# Patient Record
Sex: MALE | Race: BLACK OR AFRICAN AMERICAN | NOT HISPANIC OR LATINO | Employment: OTHER | URBAN - METROPOLITAN AREA
[De-identification: names, ages, dates, MRNs, and addresses within clinical notes are randomized per-mention and may not be internally consistent; named-entity substitution may affect disease eponyms.]

---

## 2020-12-03 ENCOUNTER — OFFICE VISIT (OUTPATIENT)
Dept: URGENT CARE | Facility: CLINIC | Age: 70
End: 2020-12-03
Payer: MEDICARE

## 2020-12-03 VITALS
RESPIRATION RATE: 18 BRPM | BODY MASS INDEX: 25.8 KG/M2 | WEIGHT: 174.2 LBS | HEIGHT: 69 IN | DIASTOLIC BLOOD PRESSURE: 72 MMHG | OXYGEN SATURATION: 98 % | SYSTOLIC BLOOD PRESSURE: 144 MMHG | HEART RATE: 88 BPM | TEMPERATURE: 97.8 F

## 2020-12-03 DIAGNOSIS — M25.511 ACUTE PAIN OF RIGHT SHOULDER: Primary | ICD-10-CM

## 2020-12-03 PROCEDURE — 99213 OFFICE O/P EST LOW 20 MIN: CPT | Performed by: PHYSICIAN ASSISTANT

## 2020-12-03 RX ORDER — IBUPROFEN 200 MG
200 TABLET ORAL EVERY 6 HOURS PRN
COMMUNITY
End: 2021-05-06

## 2020-12-03 RX ORDER — AMOXICILLIN 500 MG/1
500 TABLET, FILM COATED ORAL 2 TIMES DAILY
COMMUNITY
End: 2021-05-06

## 2020-12-10 ENCOUNTER — APPOINTMENT (OUTPATIENT)
Dept: RADIOLOGY | Facility: CLINIC | Age: 70
End: 2020-12-10
Payer: MEDICARE

## 2020-12-10 VITALS
HEIGHT: 69 IN | WEIGHT: 174 LBS | DIASTOLIC BLOOD PRESSURE: 70 MMHG | HEART RATE: 98 BPM | SYSTOLIC BLOOD PRESSURE: 150 MMHG | BODY MASS INDEX: 25.77 KG/M2

## 2020-12-10 DIAGNOSIS — M25.511 ACUTE PAIN OF RIGHT SHOULDER: ICD-10-CM

## 2020-12-10 DIAGNOSIS — M19.011 PRIMARY OSTEOARTHRITIS OF RIGHT SHOULDER: Primary | ICD-10-CM

## 2020-12-10 DIAGNOSIS — M25.511 CHRONIC RIGHT SHOULDER PAIN: ICD-10-CM

## 2020-12-10 DIAGNOSIS — G89.29 CHRONIC RIGHT SHOULDER PAIN: ICD-10-CM

## 2020-12-10 PROCEDURE — 73030 X-RAY EXAM OF SHOULDER: CPT

## 2020-12-10 PROCEDURE — 20610 DRAIN/INJ JOINT/BURSA W/O US: CPT | Performed by: ORTHOPAEDIC SURGERY

## 2020-12-10 PROCEDURE — 99204 OFFICE O/P NEW MOD 45 MIN: CPT | Performed by: ORTHOPAEDIC SURGERY

## 2020-12-10 RX ORDER — LIDOCAINE HYDROCHLORIDE 10 MG/ML
4 INJECTION, SOLUTION INFILTRATION; PERINEURAL
Status: COMPLETED | OUTPATIENT
Start: 2020-12-10 | End: 2020-12-10

## 2020-12-10 RX ORDER — DEXAMETHASONE SODIUM PHOSPHATE 100 MG/10ML
40 INJECTION INTRAMUSCULAR; INTRAVENOUS
Status: COMPLETED | OUTPATIENT
Start: 2020-12-10 | End: 2020-12-10

## 2020-12-10 RX ADMIN — LIDOCAINE HYDROCHLORIDE 4 ML: 10 INJECTION, SOLUTION INFILTRATION; PERINEURAL at 10:17

## 2020-12-10 RX ADMIN — DEXAMETHASONE SODIUM PHOSPHATE 40 MG: 100 INJECTION INTRAMUSCULAR; INTRAVENOUS at 10:17

## 2021-01-19 ENCOUNTER — EVALUATION (OUTPATIENT)
Dept: PHYSICAL THERAPY | Facility: CLINIC | Age: 71
End: 2021-01-19
Payer: MEDICARE

## 2021-01-19 DIAGNOSIS — G89.29 CHRONIC RIGHT SHOULDER PAIN: ICD-10-CM

## 2021-01-19 DIAGNOSIS — M19.011 PRIMARY OSTEOARTHRITIS OF RIGHT SHOULDER: Primary | ICD-10-CM

## 2021-01-19 DIAGNOSIS — M25.511 CHRONIC RIGHT SHOULDER PAIN: ICD-10-CM

## 2021-01-19 PROCEDURE — 97161 PT EVAL LOW COMPLEX 20 MIN: CPT | Performed by: PHYSICAL THERAPIST

## 2021-01-19 NOTE — PROGRESS NOTES
PT Evaluation     Today's date: 2021  Patient name: Brittny Saldaña  : 1950  MRN: [id number]  Referring provider: Eileen Robles  Dx:   Encounter Diagnosis     ICD-10-CM    1  Primary osteoarthritis of right shoulder  M19 011 Ambulatory referral to Physical Therapy   2  Chronic right shoulder pain  M25 511 Ambulatory referral to Physical Therapy    G89 [id number]                   Assessment  Assessment details: Brittny Saldaña is a 79 y o  male who presents to physical therapy with pain, decreased UE range of motion, decreased UE strength, impaired function, decreased activity tolerance and poor posture  Patient's clinical presentation is consistent with their referring diagnosis of Primary osteoarthritis of right shoulder  (primary encounter diagnosis)  Chronic right shoulder pain  The pt presents with functional limitations of ADLs, recreational activities, self-care and reaching  Pt would benefit from physical therapy services to address these limitations and maximize function  Pt was instructed and educated on good sitting posture today and demonstrates understanding  Impairments: abnormal muscle firing, abnormal muscle tone, abnormal or restricted ROM, activity intolerance, impaired physical strength, lacks appropriate home exercise program, pain with function, scapular dyskinesis, poor posture  and poor body mechanics  Understanding of Dx/Px/POC: good   Prognosis: good    Goals  Short term goals:  (3 weeks)  1  Patient will have pain level 2/10 right  shoulder at rest  2  Patient will report a 50% improvement in symptoms with ADL's  3  Patient will demonstrate appropriate scapulohumeral rhythm with reaching shoulder height and below  4  Patient will have improved right shoulder ROM by 20 degrees    Long term goals: (6 weeks)  1  Patient will report 85 % improvement improvement in symptoms with ADL's  2  Patient will have pain level 2/10 right shoulder with ADL's   3   Patient will demonstrate appropriate scapulohumeral rhythm with reaching overhead  4  Patient will be independent in a comprehensive home exercise program      Plan  Patient would benefit from: PT eval and skilled physical therapy  Planned modality interventions: cryotherapy and thermotherapy: hydrocollator packs  Planned therapy interventions: joint mobilization, manual therapy, massage, neuromuscular re-education, patient education, postural training, strengthening, stretching, therapeutic activities, ADL training, functional ROM exercises, home exercise program, abdominal trunk stabilization and therapeutic exercise  Frequency: 2x week  Duration in visits: 12  Duration in weeks: 6  Treatment plan discussed with: patient        Subjective Evaluation    History of Present Illness  Mechanism of injury: He reports pain on/ off in right shoulder for the last 10 years since a fall at work  Hefollowed up with Dr Misti Ignacio  An x-ray was taken  He received a cortisone injection  He was then referred to PT  Pain  Current pain rating: 3  At best pain ratin  At worst pain ratin  Quality: sharp and dull ache  Relieving factors: rest  Aggravating factors: lifting and overhead activity    Social Support    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, pull, push ,carry  Hand dominance: right    Patient Goals  Patient goals for therapy: decreased pain          Objective     Postural Observations  Seated posture: fair    Additional Postural Observation Details  Patient has slouched posture in sitting       Palpation     Right   Hypertonic in the pectoralis major, pectoralis minor, rhomboids and upper trapezius  Tenderness of the pectoralis minor and upper trapezius       Passive Range of Motion   Left Shoulder   Flexion: 170 degrees   Abduction: 170 degrees   External rotation 0°: 85 degrees   Internal rotation 0°: 85 degrees     Right Shoulder   Flexion: 131 degrees   Abduction: 74 degrees   External rotation 0°: 62 degrees   Internal rotation 0°: 77 degrees     Scapular Mobility     Right Shoulder   Scapular mobility: poor    Scapular Mobility beyond 90° FF   Scapular elevation: minimal    Strength/Myotome Testing     Left Shoulder     Planes of Motion   Flexion: 5   Abduction: 5   External rotation at 0°: 5   Internal rotation at 0°: 5     Right Shoulder     Planes of Motion   Flexion: 4   Abduction: 4-   External rotation at 0°: 4-   Internal rotation at 0°: 4              Precautions: -  MT x 1 , TE x 1      Specialty Daily Treatment Diary       Manuals 1/19/21       Visit # 1       STM UT, pec group        PROM shld        ST joint mobs                Warm-up        NuStep        Neuro Re-Ed        Posture correct        Scap squeeze                                Ther Ex        TB row        TB ext        Overhead w/ ball        Pulleys        S/L ER        S/L rotation        Wand flex                Ther Activity                                Modalities        CP / MH

## 2021-01-28 ENCOUNTER — OFFICE VISIT (OUTPATIENT)
Dept: PHYSICAL THERAPY | Facility: CLINIC | Age: 71
End: 2021-01-28
Payer: MEDICARE

## 2021-01-28 DIAGNOSIS — M19.011 PRIMARY OSTEOARTHRITIS OF RIGHT SHOULDER: Primary | ICD-10-CM

## 2021-01-28 DIAGNOSIS — G89.29 CHRONIC RIGHT SHOULDER PAIN: ICD-10-CM

## 2021-01-28 DIAGNOSIS — M25.511 CHRONIC RIGHT SHOULDER PAIN: ICD-10-CM

## 2021-01-28 PROCEDURE — 97110 THERAPEUTIC EXERCISES: CPT

## 2021-01-28 PROCEDURE — 97140 MANUAL THERAPY 1/> REGIONS: CPT

## 2021-01-28 NOTE — PROGRESS NOTES
Daily Note      Today's date: 2021  Patient name: Rell Guerrero  : 1950  MRN: [id number]  Referring provider: Jenny Quan  Dx:   Encounter Diagnosis     ICD-10-CM    1  Primary osteoarthritis of right shoulder  M19 011    2  Chronic right shoulder pain  M25 511     G89 29        Subjective: Pt reports that his shoulder is feeling much better than two weeks ago  Pt states he has 1-2/10 pain  Objective: See treatment diary below    Assessment: Pt tolerated treatment well  Pt introduced to cane flexion, rows, shoulder extension, and Nustep and noted no increase in symptoms in the R shoulder  Pt presents with mild limitations in shoulder flexion A/AROM  Pt educated in HEP including rows, shoulder extensions, and cane flexion and verbalized good understanding  Plan: Continue per plan of care           Precautions: -  MT x 1 , TE x 1      Specialty Daily Treatment Diary       Manuals 21      Visit # 1 2      STM UT, pec group  STM R pec minor      PROM shld  Flex, abd, ER, IR      ST joint mobs                Warm-up        NuStep  5' L1 end of session      Neuro Re-Ed        Posture correct        Scap squeeze  10x3s                              Ther Ex        TB row  20x green      TB ext  20x red      Overhead w/ ball  Ball circles 30/30      Pulleys  20x      S/L ER  20x AROM      S/L rotation        Wand flex  20x supine              Ther Activity        Pt education  HEP - rows, ext, cane flex                      Modalities        CP / MH

## 2021-03-28 NOTE — PROGRESS NOTES
PT Discharge    Patient has not attended scheduled PT sessions  Goal status is unknown  D/C at this time due to non-compliance

## 2021-05-06 ENCOUNTER — OFFICE VISIT (OUTPATIENT)
Dept: FAMILY MEDICINE CLINIC | Facility: CLINIC | Age: 71
End: 2021-05-06
Payer: MEDICARE

## 2021-05-06 VITALS
RESPIRATION RATE: 16 BRPM | HEIGHT: 69 IN | DIASTOLIC BLOOD PRESSURE: 78 MMHG | BODY MASS INDEX: 25.62 KG/M2 | WEIGHT: 173 LBS | HEART RATE: 100 BPM | SYSTOLIC BLOOD PRESSURE: 134 MMHG | TEMPERATURE: 98.3 F

## 2021-05-06 DIAGNOSIS — K21.9 GASTROESOPHAGEAL REFLUX DISEASE, UNSPECIFIED WHETHER ESOPHAGITIS PRESENT: ICD-10-CM

## 2021-05-06 DIAGNOSIS — Z13.89 SCREENING FOR CARDIOVASCULAR, RESPIRATORY, AND GENITOURINARY DISEASES: ICD-10-CM

## 2021-05-06 DIAGNOSIS — J30.2 SEASONAL ALLERGIC RHINITIS, UNSPECIFIED TRIGGER: ICD-10-CM

## 2021-05-06 DIAGNOSIS — H10.13 ALLERGIC CONJUNCTIVITIS OF BOTH EYES: ICD-10-CM

## 2021-05-06 DIAGNOSIS — N52.9 ERECTILE DYSFUNCTION, UNSPECIFIED ERECTILE DYSFUNCTION TYPE: ICD-10-CM

## 2021-05-06 DIAGNOSIS — Z00.00 MEDICARE ANNUAL WELLNESS VISIT, SUBSEQUENT: Primary | ICD-10-CM

## 2021-05-06 DIAGNOSIS — Z13.6 SCREENING FOR CARDIOVASCULAR, RESPIRATORY, AND GENITOURINARY DISEASES: ICD-10-CM

## 2021-05-06 DIAGNOSIS — Z13.83 SCREENING FOR CARDIOVASCULAR, RESPIRATORY, AND GENITOURINARY DISEASES: ICD-10-CM

## 2021-05-06 DIAGNOSIS — Z12.11 COLON CANCER SCREENING: ICD-10-CM

## 2021-05-06 DIAGNOSIS — Z12.5 PROSTATE CANCER SCREENING: ICD-10-CM

## 2021-05-06 DIAGNOSIS — Z13.1 SCREENING FOR DIABETES MELLITUS (DM): ICD-10-CM

## 2021-05-06 DIAGNOSIS — H61.21 IMPACTED CERUMEN OF RIGHT EAR: ICD-10-CM

## 2021-05-06 PROCEDURE — G0438 PPPS, INITIAL VISIT: HCPCS | Performed by: FAMILY MEDICINE

## 2021-05-06 PROCEDURE — 1123F ACP DISCUSS/DSCN MKR DOCD: CPT | Performed by: FAMILY MEDICINE

## 2021-05-06 PROCEDURE — 99214 OFFICE O/P EST MOD 30 MIN: CPT | Performed by: FAMILY MEDICINE

## 2021-05-06 RX ORDER — FLUTICASONE PROPIONATE 50 MCG
2 SPRAY, SUSPENSION (ML) NASAL DAILY
Qty: 1 BOTTLE | Refills: 5 | Status: SHIPPED | OUTPATIENT
Start: 2021-05-06

## 2021-05-06 RX ORDER — OLOPATADINE HYDROCHLORIDE 1 MG/ML
1 SOLUTION/ DROPS OPHTHALMIC 2 TIMES DAILY
Qty: 5 ML | Refills: 5 | Status: SHIPPED | OUTPATIENT
Start: 2021-05-06

## 2021-05-06 RX ORDER — TADALAFIL 20 MG/1
20 TABLET ORAL DAILY PRN
Qty: 20 TABLET | Refills: 5 | Status: SHIPPED | OUTPATIENT
Start: 2021-05-06 | End: 2021-12-27 | Stop reason: SDUPTHER

## 2021-05-06 NOTE — PATIENT INSTRUCTIONS
Please try to get us your old medical records and vaccination records  We recommend you get your screening colonoscopy updated and you may need an upper endoscopy also for your chronic acid reflux symptoms  You do have some wax in your right ear  You can try using hydrogen peroxide before a shower and see if it will help it come out  You may need to do this daily for 1 week  We can try a prescription for flonase nasal spray and patanol eye drops for your allergy symptoms  Medicare Preventive Visit Patient Instructions  Thank you for completing your Welcome to Medicare Visit or Medicare Annual Wellness Visit today  Your next wellness visit will be due in one year (5/7/2022)  The screening/preventive services that you may require over the next 5-10 years are detailed below  Some tests may not apply to you based off risk factors and/or age  Screening tests ordered at today's visit but not completed yet may show as past due  Also, please note that scanned in results may not display below  Preventive Screenings:  Service Recommendations Previous Testing/Comments   Colorectal Cancer Screening  · Colonoscopy    · Fecal Occult Blood Test (FOBT)/Fecal Immunochemical Test (FIT)  · Fecal DNA/Cologuard Test  · Flexible Sigmoidoscopy Age: 54-65 years old   Colonoscopy: every 10 years (May be performed more frequently if at higher risk)  OR  FOBT/FIT: every 1 year  OR  Cologuard: every 3 years  OR  Sigmoidoscopy: every 5 years  Screening may be recommended earlier than age 48 if at higher risk for colorectal cancer  Also, an individualized decision between you and your healthcare provider will decide whether screening between the ages of 74-80 would be appropriate   Colonoscopy: Not on file  FOBT/FIT: Not on file  Cologuard: Not on file  Sigmoidoscopy: Not on file    Risks and Benefits Discussed     Prostate Cancer Screening Individualized decision between patient and health care provider in men between ages of 53-78 Medicare will cover every 12 months beginning on the day after your 50th birthday PSA: No results in last 5 years     Risks and Benefits Discussed     Hepatitis C Screening Once for adults born between 1945 and 1965  More frequently in patients at high risk for Hepatitis C Hep C Antibody: Not on file    Screening Current  Patient Declines   Diabetes Screening 1-2 times per year if you're at risk for diabetes or have pre-diabetes Fasting glucose: No results in last 5 years   A1C: No results in last 5 years    Risks and Benefits Discussed   Cholesterol Screening Once every 5 years if you don't have a lipid disorder  May order more often based on risk factors  Lipid panel: Not on file    Risks and Benefits Discussed      Other Preventive Screenings Covered by Medicare:  1  Abdominal Aortic Aneurysm (AAA) Screening: covered once if your at risk  You're considered to be at risk if you have a family history of AAA or a male between the age of 73-68 who smoking at least 100 cigarettes in your lifetime  2  Lung Cancer Screening: covers low dose CT scan once per year if you meet all of the following conditions: (1) Age 50-69; (2) No signs or symptoms of lung cancer; (3) Current smoker or have quit smoking within the last 15 years; (4) You have a tobacco smoking history of at least 30 pack years (packs per day x number of years you smoked); (5) You get a written order from a healthcare provider  3  Glaucoma Screening: covered annually if you're considered high risk: (1) You have diabetes OR (2) Family history of glaucoma OR (3)  aged 48 and older OR (3)  American aged 72 and older  3  Osteoporosis Screening: covered every 2 years if you meet one of the following conditions: (1) Have a vertebral abnormality; (2) On glucocorticoid therapy for more than 3 months; (3) Have primary hyperparathyroidism; (4) On osteoporosis medications and need to assess response to drug therapy    5  HIV Screening: covered annually if you're between the age of 15-65  Also covered annually if you are younger than 13 and older than 72 with risk factors for HIV infection  For pregnant patients, it is covered up to 3 times per pregnancy  Immunizations:  Immunization Recommendations   Influenza Vaccine Annual influenza vaccination during flu season is recommended for all persons aged >= 6 months who do not have contraindications   Pneumococcal Vaccine (Prevnar and Pneumovax)  * Prevnar = PCV13  * Pneumovax = PPSV23 Adults 25-60 years old: 1-3 doses may be recommended based on certain risk factors  Adults 72 years old: Prevnar (PCV13) vaccine recommended followed by Pneumovax (PPSV23) vaccine  If already received PPSV23 since turning 65, then PCV13 recommended at least one year after PPSV23 dose  Hepatitis B Vaccine 3 dose series if at intermediate or high risk (ex: diabetes, end stage renal disease, liver disease)   Tetanus (Td) Vaccine - COST NOT COVERED BY MEDICARE PART B Following completion of primary series, a booster dose should be given every 10 years to maintain immunity against tetanus  Td may also be given as tetanus wound prophylaxis  Tdap Vaccine - COST NOT COVERED BY MEDICARE PART B Recommended at least once for all adults  For pregnant patients, recommended with each pregnancy  Shingles Vaccine (Shingrix) - COST NOT COVERED BY MEDICARE PART B  2 shot series recommended in those aged 48 and above     Health Maintenance Due:      Topic Date Due    Colorectal Cancer Screening  Never done    Hepatitis C Screening  05/06/2035 (Originally 1950)     Immunizations Due:      Topic Date Due    COVID-19 Vaccine (1) Never done     Advance Directives   What are advance directives? Advance directives are legal documents that state your wishes and plans for medical care  These plans are made ahead of time in case you lose your ability to make decisions for yourself   Advance directives can apply to any medical decision, such as the treatments you want, and if you want to donate organs  What are the types of advance directives? There are many types of advance directives, and each state has rules about how to use them  You may choose a combination of any of the following:  · Living will: This is a written record of the treatment you want  You can also choose which treatments you do not want, which to limit, and which to stop at a certain time  This includes surgery, medicine, IV fluid, and tube feedings  · Durable power of  for healthcare [facility]): This is a written record that states who you want to make healthcare choices for you when you are unable to make them for yourself  This person, called a proxy, is usually a family member or a friend  You may choose more than 1 proxy  · Do not resuscitate (DNR) order:  A DNR order is used in case your heart stops beating or you stop breathing  It is a request not to have certain forms of treatment, such as CPR  A DNR order may be included in other types of advance directives  · Medical directive: This covers the care that you want if you are in a coma, near death, or unable to make decisions for yourself  You can list the treatments you want for each condition  Treatment may include pain medicine, surgery, blood transfusions, dialysis, IV or tube feedings, and a ventilator (breathing machine)  · Values history: This document has questions about your views, beliefs, and how you feel and think about life  This information can help others choose the care that you would choose  Why are advance directives important? An advance directive helps you control your care  Although spoken wishes may be used, it is better to have your wishes written down  Spoken wishes can be misunderstood, or not followed  Treatments may be given even if you do not want them  An advance directive may make it easier for your family to make difficult choices about your care     Weight Management   Why it is important to manage your weight:  Being overweight increases your risk of health conditions such as heart disease, high blood pressure, type 2 diabetes, and certain types of cancer  It can also increase your risk for osteoarthritis, sleep apnea, and other respiratory problems  Aim for a slow, steady weight loss  Even a small amount of weight loss can lower your risk of health problems  How to lose weight safely:  A safe and healthy way to lose weight is to eat fewer calories and get regular exercise  You can lose up about 1 pound a week by decreasing the number of calories you eat by 500 calories each day  Healthy meal plan for weight management:  A healthy meal plan includes a variety of foods, contains fewer calories, and helps you stay healthy  A healthy meal plan includes the following:  · Eat whole-grain foods more often  A healthy meal plan should contain fiber  Fiber is the part of grains, fruits, and vegetables that is not broken down by your body  Whole-grain foods are healthy and provide extra fiber in your diet  Some examples of whole-grain foods are whole-wheat breads and pastas, oatmeal, brown rice, and bulgur  · Eat a variety of vegetables every day  Include dark, leafy greens such as spinach, kale, nikky greens, and mustard greens  Eat yellow and orange vegetables such as carrots, sweet potatoes, and winter squash  · Eat a variety of fruits every day  Choose fresh or canned fruit (canned in its own juice or light syrup) instead of juice  Fruit juice has very little or no fiber  · Eat low-fat dairy foods  Drink fat-free (skim) milk or 1% milk  Eat fat-free yogurt and low-fat cottage cheese  Try low-fat cheeses such as mozzarella and other reduced-fat cheeses  · Choose meat and other protein foods that are low in fat  Choose beans or other legumes such as split peas or lentils   Choose fish, skinless poultry (chicken or turkey), or lean cuts of red meat (beef or pork)  Before you cook meat or poultry, cut off any visible fat  · Use less fat and oil  Try baking foods instead of frying them  Add less fat, such as margarine, sour cream, regular salad dressing and mayonnaise to foods  Eat fewer high-fat foods  Some examples of high-fat foods include french fries, doughnuts, ice cream, and cakes  · Eat fewer sweets  Limit foods and drinks that are high in sugar  This includes candy, cookies, regular soda, and sweetened drinks  Exercise:  Exercise at least 30 minutes per day on most days of the week  Some examples of exercise include walking, biking, dancing, and swimming  You can also fit in more physical activity by taking the stairs instead of the elevator or parking farther away from stores  Ask your healthcare provider about the best exercise plan for you  © Copyright [employer] 2018 Information is for End User's use only and may not be sold, redistributed or otherwise used for commercial purposes   All illustrations and images included in CareNotes® are the copyrighted property of A D A M , [employer]  or [address] [place] [address]
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
## 2021-05-07 NOTE — PROGRESS NOTES
Assessment and Plan:     Problem List Items Addressed This Visit        Active Problems    ED (erectile dysfunction)    Relevant Medications    tadalafil (CIALIS) 20 MG tablet    GERD (gastroesophageal reflux disease)    Relevant Medications    esomeprazole (NexIUM) 20 mg capsule    Other Relevant Orders    Ambulatory referral to Gastroenterology    Seasonal allergic rhinitis    Relevant Medications    fluticasone (FLONASE) 50 mcg/act nasal spray    Screening for cardiovascular, respiratory, and genitourinary diseases    Relevant Orders    Lipid Panel with Direct LDL reflex    Screening for diabetes mellitus (DM)    Relevant Orders    Comprehensive metabolic panel    Prostate cancer screening    Relevant Orders    PSA, Total Screen    Colon cancer screening    Relevant Orders    Ambulatory referral to Gastroenterology    Medicare annual wellness visit, subsequent - Primary    Allergic conjunctivitis of both eyes    Relevant Medications    olopatadine (PATANOL) 0 1 % ophthalmic solution    Impacted cerumen of right ear           Preventive health issues were discussed with patient, and age appropriate screening tests were ordered as noted in patient's After Visit Summary  Personalized health advice and appropriate referrals for health education or preventive services given if needed, as noted in patient's After Visit Summary       History of Present Illness:     Patient presents for Medicare Annual Wellness visit    Patient Care Team:  Bere Alamo DO as PCP - General (Family Medicine)     Problem List:     Patient Active Problem List   Diagnosis    Primary osteoarthritis of right shoulder    Dyslipidemia    ED (erectile dysfunction)    GERD (gastroesophageal reflux disease)    Hyperpigmentation of skin    Seasonal allergic rhinitis    Screening for cardiovascular, respiratory, and genitourinary diseases    Screening for diabetes mellitus (DM)    Prostate cancer screening    Colon cancer screening    Medicare annual wellness visit, subsequent    Allergic conjunctivitis of both eyes    Impacted cerumen of right ear      Past Medical and Surgical History:     History reviewed  No pertinent past medical history  History reviewed  No pertinent surgical history  Family History:     History reviewed  No pertinent family history     Social History:        Social History     Socioeconomic History    Marital status: /Civil Union     Spouse name: None    Number of children: None    Years of education: None    Highest education level: None   Occupational History    None   Social Needs    Financial resource strain: None    Food insecurity     Worry: None     Inability: None    Transportation needs     Medical: None     Non-medical: None   Tobacco Use    Smoking status: Never Smoker    Smokeless tobacco: Never Used   Substance and Sexual Activity    Alcohol use: Never     Frequency: Never    Drug use: Never    Sexual activity: None   Lifestyle    Physical activity     Days per week: None     Minutes per session: None    Stress: None   Relationships    Social connections     Talks on phone: None     Gets together: None     Attends Dru[employer] service: None     Active member of club or organization: None     Attends meetings of clubs or organizations: None     Relationship status: None    Intimate partner violence     Fear of current or ex partner: None     Emotionally abused: None     Physically abused: None     Forced sexual activity: None   Other Topics Concern    None   Social History Narrative    None      Medications and Allergies:     Current Outpatient Medications   Medication Sig Dispense Refill    esomeprazole (NexIUM) 20 mg capsule Take 20 mg by mouth every morning before breakfast      fluticasone (FLONASE) 50 mcg/act nasal spray 2 sprays into each nostril daily 1 Bottle 5    olopatadine (PATANOL) 0 1 % ophthalmic solution Administer 1 drop to both eyes 2 (two) times a day 5 mL 5    tadalafil (CIALIS) 20 MG tablet Take 1 tablet (20 mg total) by mouth daily as needed for erectile dysfunction 20 tablet 5     No current facility-administered medications for this visit  No Known Allergies   Immunizations:     Immunization History   Administered Date(s) Administered    INFLUENZA 08/22/2012, 08/24/2017, 09/08/2018    Influenza Split High Dose Preservative Free IM 09/05/2016    Pneumococcal Conjugate 13-Valent 02/15/2016    Pneumococcal Polysaccharide PPV23 06/23/2017    Tdap 03/15/2011      Health Maintenance:         Topic Date Due    Colorectal Cancer Screening  Never done    Hepatitis C Screening  05/06/2035 (Originally 1950)         Topic Date Due    COVID-19 Vaccine (1) Never done      Medicare Health Risk Assessment:     /78   Pulse 100   Temp 98 3 °F (36 8 °C)   Resp 16   Ht 5' 9" (1 753 m)   Wt 78 5 kg (173 lb)   BMI 25 55 kg/m²      Rocio Whitten is here for his Subsequent Wellness visit  Last Medicare Wellness visit information reviewed, patient interviewed and updates made to the record today  Health Risk Assessment:   Patient rates overall health as good  Patient feels that their physical health rating is same  Patient is satisfied with their life  Eyesight was rated as same  Hearing was rated as same  Patient feels that their emotional and mental health rating is same  Patients states they are never, rarely angry  Patient states they are sometimes unusually tired/fatigued  Pain experienced in the last 7 days has been some  Patient's pain rating has been 5/10  Patient states that he has experienced no weight loss or gain in last 6 months  Depression Screening:   PHQ-2 Score: 0      Fall Risk Screening: In the past year, patient has experienced: no history of falling in past year      Home Safety:  Patient does not have trouble with stairs inside or outside of their home  Patient has working smoke alarms and has no working carbon monoxide detector   Home safety hazards include: none  Nutrition:   Current diet is Regular  Medications:   Patient is not currently taking any over-the-counter supplements  Patient is able to manage medications  Activities of Daily Living (ADLs)/Instrumental Activities of Daily Living (IADLs):   Walk and transfer into and out of bed and chair?: Yes  Dress and groom yourself?: Yes    Bathe or shower yourself?: Yes    Feed yourself? Yes  Do your laundry/housekeeping?: Yes  Manage your money, pay your bills and track your expenses?: Yes  Make your own meals?: Yes    Do your own shopping?: Yes    Previous Hospitalizations:   Any hospitalizations or ED visits within the last 12 months?: No      Advance Care Planning:   Living will: No    Durable POA for healthcare: No    End of Life Decisions reviewed with patient: No      Cognitive Screening:   Provider or family/friend/caregiver concerned regarding cognition?: No    PREVENTIVE SCREENINGS      Cardiovascular Screening:    General: Risks and Benefits Discussed      Diabetes Screening:     General: Risks and Benefits Discussed      Colorectal Cancer Screening:     General: Risks and Benefits Discussed      Prostate Cancer Screening:    General: Risks and Benefits Discussed      Osteoporosis Screening:    General: Screening Not Indicated      Abdominal Aortic Aneurysm (AAA) Screening:    Risk factors include: age between 73-67 yo        General: Screening Not Indicated      Lung Cancer Screening:     General: Screening Not Indicated      Hepatitis C Screening:    General: Screening Current and Patient Declines    Hep C Screening Accepted: No     Screening, Brief Intervention, and Referral to Treatment (SBIRT)    Screening  Typical number of drinks in a day: 0  Typical number of drinks in a week: 0  Interpretation: Low risk drinking behavior      Single Item Drug Screening:  How often have you used an illegal drug (including marijuana) or a prescription medication for non-medical reasons in the past year? never    Single Item Drug Screen Score: 0  Interpretation: Negative screen for possible drug use disorder    Other Counseling Topics:   Car/seat belt/driving safety and regular weightbearing exercise         Estrella Tee, DO

## 2021-07-09 ENCOUNTER — TELEPHONE (OUTPATIENT)
Dept: GASTROENTEROLOGY | Facility: CLINIC | Age: 71
End: 2021-07-09

## 2021-07-09 NOTE — TELEPHONE ENCOUNTER
Received order from Dr Germán Hussein for pt to be scheduled for EGD/Colon for screening/GERD  Pt is a new pt to us and would either need to be asked oa questions to be scheduled directly or schedule consult  I lmom for pt to please call back to schedule an appt per Dr Germán Hussein  Will call pt again in two weeks if do not hear back from him

## 2021-07-29 LAB
ALBUMIN SERPL-MCNC: 4.3 G/DL (ref 3.6–5.1)
ALBUMIN/GLOB SERPL: 1.3 (CALC) (ref 1–2.5)
ALP SERPL-CCNC: 64 U/L (ref 35–144)
ALT SERPL-CCNC: 23 U/L (ref 9–46)
AST SERPL-CCNC: 19 U/L (ref 10–35)
BILIRUB SERPL-MCNC: 0.5 MG/DL (ref 0.2–1.2)
BUN SERPL-MCNC: 20 MG/DL (ref 7–25)
BUN/CREAT SERPL: ABNORMAL (CALC) (ref 6–22)
CALCIUM SERPL-MCNC: 9.7 MG/DL (ref 8.6–10.3)
CHLORIDE SERPL-SCNC: 102 MMOL/L (ref 98–110)
CHOLEST SERPL-MCNC: 206 MG/DL
CHOLEST/HDLC SERPL: 4.2 (CALC)
CO2 SERPL-SCNC: 31 MMOL/L (ref 20–32)
CREAT SERPL-MCNC: 1.08 MG/DL (ref 0.7–1.18)
GLOBULIN SER CALC-MCNC: 3.3 G/DL (CALC) (ref 1.9–3.7)
GLUCOSE SERPL-MCNC: 124 MG/DL (ref 65–99)
HDLC SERPL-MCNC: 49 MG/DL
LDLC SERPL CALC-MCNC: 140 MG/DL (CALC)
NONHDLC SERPL-MCNC: 157 MG/DL (CALC)
POTASSIUM SERPL-SCNC: 4.2 MMOL/L (ref 3.5–5.3)
PROT SERPL-MCNC: 7.6 G/DL (ref 6.1–8.1)
PSA SERPL-MCNC: 2.7 NG/ML
SL AMB EGFR AFRICAN AMERICAN: 80 ML/MIN/1.73M2
SL AMB EGFR NON AFRICAN AMERICAN: 69 ML/MIN/1.73M2
SODIUM SERPL-SCNC: 138 MMOL/L (ref 135–146)
TRIGL SERPL-MCNC: 74 MG/DL

## 2021-07-30 PROBLEM — Z91.89 FRAMINGHAM CARDIAC RISK 10-20% IN NEXT 10 YEARS: Status: ACTIVE | Noted: 2021-07-30

## 2021-12-27 ENCOUNTER — OFFICE VISIT (OUTPATIENT)
Dept: FAMILY MEDICINE CLINIC | Facility: CLINIC | Age: 71
End: 2021-12-27
Payer: MEDICARE

## 2021-12-27 VITALS
DIASTOLIC BLOOD PRESSURE: 76 MMHG | WEIGHT: 177.6 LBS | BODY MASS INDEX: 26.31 KG/M2 | HEART RATE: 82 BPM | RESPIRATION RATE: 18 BRPM | TEMPERATURE: 96.4 F | SYSTOLIC BLOOD PRESSURE: 138 MMHG | HEIGHT: 69 IN

## 2021-12-27 DIAGNOSIS — K21.9 GASTROESOPHAGEAL REFLUX DISEASE, UNSPECIFIED WHETHER ESOPHAGITIS PRESENT: ICD-10-CM

## 2021-12-27 DIAGNOSIS — L30.9 FINGERTIP ECZEMA: Primary | ICD-10-CM

## 2021-12-27 DIAGNOSIS — N52.9 ERECTILE DYSFUNCTION, UNSPECIFIED ERECTILE DYSFUNCTION TYPE: ICD-10-CM

## 2021-12-27 PROCEDURE — 99214 OFFICE O/P EST MOD 30 MIN: CPT | Performed by: FAMILY MEDICINE

## 2021-12-27 RX ORDER — TADALAFIL 20 MG/1
20 TABLET ORAL DAILY PRN
Qty: 10 TABLET | Refills: 5 | Status: SHIPPED | OUTPATIENT
Start: 2021-12-27 | End: 2022-06-21

## 2021-12-27 RX ORDER — TRIAMCINOLONE ACETONIDE 5 MG/G
CREAM TOPICAL 2 TIMES DAILY
Qty: 15 G | Refills: 5 | Status: SHIPPED | OUTPATIENT
Start: 2021-12-27 | End: 2022-05-19

## 2022-01-05 ENCOUNTER — HOSPITAL ENCOUNTER (OUTPATIENT)
Dept: RADIOLOGY | Facility: HOSPITAL | Age: 72
Discharge: HOME | End: 2022-01-05
Attending: FAMILY MEDICINE
Payer: MEDICARE

## 2022-01-05 DIAGNOSIS — K21.9 GASTROESOPHAGEAL REFLUX DISEASE, UNSPECIFIED WHETHER ESOPHAGITIS PRESENT: ICD-10-CM

## 2022-01-05 PROCEDURE — 74220 X-RAY XM ESOPHAGUS 1CNTRST: CPT

## 2022-01-06 PROBLEM — K44.9 HIATAL HERNIA WITH GERD: Status: ACTIVE | Noted: 2022-01-06

## 2022-01-06 PROBLEM — K21.9 HIATAL HERNIA WITH GERD: Status: ACTIVE | Noted: 2022-01-06

## 2022-04-05 ENCOUNTER — HOSPITAL ENCOUNTER (OUTPATIENT)
Facility: HOSPITAL | Age: 72
Setting detail: OBSERVATION
Discharge: HOME | End: 2022-04-06
Attending: EMERGENCY MEDICINE | Admitting: FAMILY MEDICINE
Payer: MEDICARE

## 2022-04-05 ENCOUNTER — APPOINTMENT (EMERGENCY)
Dept: RADIOLOGY | Facility: HOSPITAL | Age: 72
End: 2022-04-05
Payer: MEDICARE

## 2022-04-05 DIAGNOSIS — R42 VERTIGO: Primary | ICD-10-CM

## 2022-04-05 DIAGNOSIS — R27.0 ATAXIA: ICD-10-CM

## 2022-04-05 DIAGNOSIS — E78.5 DYSLIPIDEMIA: ICD-10-CM

## 2022-04-05 DIAGNOSIS — R29.810 FACIAL DROOP: ICD-10-CM

## 2022-04-05 PROBLEM — R29.90 STROKE-LIKE SYMPTOM: Status: ACTIVE | Noted: 2022-04-05

## 2022-04-05 PROBLEM — I63.9 CVA (CEREBRAL VASCULAR ACCIDENT) (HCC): Status: ACTIVE | Noted: 2022-04-05

## 2022-04-05 LAB
2HR DELTA HS TROPONIN: 4 NG/L
ALBUMIN SERPL BCP-MCNC: 3.6 G/DL (ref 3.5–5)
ALP SERPL-CCNC: 77 U/L (ref 46–116)
ALT SERPL W P-5'-P-CCNC: 33 U/L (ref 12–78)
ANION GAP SERPL CALCULATED.3IONS-SCNC: 10 MMOL/L (ref 4–13)
APTT PPP: 27 SECONDS (ref 23–37)
AST SERPL W P-5'-P-CCNC: 17 U/L (ref 5–45)
ATRIAL RATE: 91 BPM
BASOPHILS # BLD AUTO: 0.05 THOUSANDS/ΜL (ref 0–0.1)
BASOPHILS NFR BLD AUTO: 1 % (ref 0–1)
BILIRUB SERPL-MCNC: 0.43 MG/DL (ref 0.2–1)
BILIRUB UR QL STRIP: NEGATIVE
BUN SERPL-MCNC: 13 MG/DL (ref 5–25)
CALCIUM SERPL-MCNC: 8.7 MG/DL (ref 8.3–10.1)
CARDIAC TROPONIN I PNL SERPL HS: 5 NG/L
CARDIAC TROPONIN I PNL SERPL HS: 9 NG/L
CHLORIDE SERPL-SCNC: 104 MMOL/L (ref 100–108)
CLARITY UR: CLEAR
CO2 SERPL-SCNC: 28 MMOL/L (ref 21–32)
COLOR UR: NORMAL
CREAT SERPL-MCNC: 0.95 MG/DL (ref 0.6–1.3)
EOSINOPHIL # BLD AUTO: 0.05 THOUSAND/ΜL (ref 0–0.61)
EOSINOPHIL NFR BLD AUTO: 1 % (ref 0–6)
ERYTHROCYTE [DISTWIDTH] IN BLOOD BY AUTOMATED COUNT: 13.5 % (ref 11.6–15.1)
FLUAV RNA RESP QL NAA+PROBE: NEGATIVE
FLUBV RNA RESP QL NAA+PROBE: NEGATIVE
GFR SERPL CREATININE-BSD FRML MDRD: 80 ML/MIN/1.73SQ M
GLUCOSE SERPL-MCNC: 132 MG/DL (ref 65–140)
GLUCOSE UR STRIP-MCNC: NEGATIVE MG/DL
HCT VFR BLD AUTO: 48.2 % (ref 36.5–49.3)
HGB BLD-MCNC: 15.7 G/DL (ref 12–17)
HGB UR QL STRIP.AUTO: NEGATIVE
IMM GRANULOCYTES # BLD AUTO: 0.01 THOUSAND/UL (ref 0–0.2)
IMM GRANULOCYTES NFR BLD AUTO: 0 % (ref 0–2)
INR PPP: 1.02 (ref 0.84–1.19)
KETONES UR STRIP-MCNC: NEGATIVE MG/DL
LEUKOCYTE ESTERASE UR QL STRIP: NEGATIVE
LYMPHOCYTES # BLD AUTO: 1.37 THOUSANDS/ΜL (ref 0.6–4.47)
LYMPHOCYTES NFR BLD AUTO: 27 % (ref 14–44)
MCH RBC QN AUTO: 27.9 PG (ref 26.8–34.3)
MCHC RBC AUTO-ENTMCNC: 32.6 G/DL (ref 31.4–37.4)
MCV RBC AUTO: 86 FL (ref 82–98)
MONOCYTES # BLD AUTO: 0.45 THOUSAND/ΜL (ref 0.17–1.22)
MONOCYTES NFR BLD AUTO: 9 % (ref 4–12)
NEUTROPHILS # BLD AUTO: 3.08 THOUSANDS/ΜL (ref 1.85–7.62)
NEUTS SEG NFR BLD AUTO: 62 % (ref 43–75)
NITRITE UR QL STRIP: NEGATIVE
NRBC BLD AUTO-RTO: 0 /100 WBCS
P AXIS: 49 DEGREES
PH UR STRIP.AUTO: 8 [PH]
PLATELET # BLD AUTO: 211 THOUSANDS/UL (ref 149–390)
PMV BLD AUTO: 10.5 FL (ref 8.9–12.7)
POTASSIUM SERPL-SCNC: 3.5 MMOL/L (ref 3.5–5.3)
PR INTERVAL: 144 MS
PROT SERPL-MCNC: 7.9 G/DL (ref 6.4–8.2)
PROT UR STRIP-MCNC: NEGATIVE MG/DL
PROTHROMBIN TIME: 13.2 SECONDS (ref 11.6–14.5)
QRS AXIS: 12 DEGREES
QRSD INTERVAL: 74 MS
QT INTERVAL: 330 MS
QTC INTERVAL: 405 MS
RBC # BLD AUTO: 5.63 MILLION/UL (ref 3.88–5.62)
RSV RNA RESP QL NAA+PROBE: NEGATIVE
SARS-COV-2 RNA RESP QL NAA+PROBE: NEGATIVE
SODIUM SERPL-SCNC: 142 MMOL/L (ref 136–145)
SP GR UR STRIP.AUTO: 1.01 (ref 1–1.03)
T WAVE AXIS: 48 DEGREES
UROBILINOGEN UR QL STRIP.AUTO: 0.2 E.U./DL
VENTRICULAR RATE: 91 BPM
WBC # BLD AUTO: 5.01 THOUSAND/UL (ref 4.31–10.16)

## 2022-04-05 PROCEDURE — 36415 COLL VENOUS BLD VENIPUNCTURE: CPT | Performed by: EMERGENCY MEDICINE

## 2022-04-05 PROCEDURE — 99285 EMERGENCY DEPT VISIT HI MDM: CPT | Performed by: EMERGENCY MEDICINE

## 2022-04-05 PROCEDURE — 84484 ASSAY OF TROPONIN QUANT: CPT | Performed by: EMERGENCY MEDICINE

## 2022-04-05 PROCEDURE — 93010 ELECTROCARDIOGRAM REPORT: CPT | Performed by: INTERNAL MEDICINE

## 2022-04-05 PROCEDURE — 81003 URINALYSIS AUTO W/O SCOPE: CPT | Performed by: EMERGENCY MEDICINE

## 2022-04-05 PROCEDURE — 85610 PROTHROMBIN TIME: CPT | Performed by: EMERGENCY MEDICINE

## 2022-04-05 PROCEDURE — 70496 CT ANGIOGRAPHY HEAD: CPT

## 2022-04-05 PROCEDURE — 99220 PR INITIAL OBSERVATION CARE/DAY 70 MINUTES: CPT | Performed by: FAMILY MEDICINE

## 2022-04-05 PROCEDURE — 99285 EMERGENCY DEPT VISIT HI MDM: CPT

## 2022-04-05 PROCEDURE — 70498 CT ANGIOGRAPHY NECK: CPT

## 2022-04-05 PROCEDURE — 84484 ASSAY OF TROPONIN QUANT: CPT

## 2022-04-05 PROCEDURE — 93005 ELECTROCARDIOGRAM TRACING: CPT

## 2022-04-05 PROCEDURE — 85730 THROMBOPLASTIN TIME PARTIAL: CPT | Performed by: EMERGENCY MEDICINE

## 2022-04-05 PROCEDURE — 83036 HEMOGLOBIN GLYCOSYLATED A1C: CPT

## 2022-04-05 PROCEDURE — 0241U HB NFCT DS VIR RESP RNA 4 TRGT: CPT | Performed by: EMERGENCY MEDICINE

## 2022-04-05 PROCEDURE — 80053 COMPREHEN METABOLIC PANEL: CPT | Performed by: EMERGENCY MEDICINE

## 2022-04-05 PROCEDURE — 71045 X-RAY EXAM CHEST 1 VIEW: CPT

## 2022-04-05 PROCEDURE — 85025 COMPLETE CBC W/AUTO DIFF WBC: CPT | Performed by: EMERGENCY MEDICINE

## 2022-04-05 RX ORDER — ASPIRIN 81 MG/1
81 TABLET, CHEWABLE ORAL DAILY
Status: DISCONTINUED | OUTPATIENT
Start: 2022-04-06 | End: 2022-04-05

## 2022-04-05 RX ORDER — MECLIZINE HYDROCHLORIDE 25 MG/1
25 TABLET ORAL EVERY 8 HOURS SCHEDULED
Status: DISCONTINUED | OUTPATIENT
Start: 2022-04-05 | End: 2022-04-06 | Stop reason: HOSPADM

## 2022-04-05 RX ORDER — PANTOPRAZOLE SODIUM 20 MG/1
20 TABLET, DELAYED RELEASE ORAL
Status: DISCONTINUED | OUTPATIENT
Start: 2022-04-06 | End: 2022-04-06 | Stop reason: HOSPADM

## 2022-04-05 RX ORDER — ASPIRIN 81 MG/1
324 TABLET, CHEWABLE ORAL DAILY
Status: DISCONTINUED | OUTPATIENT
Start: 2022-04-06 | End: 2022-04-06 | Stop reason: HOSPADM

## 2022-04-05 RX ORDER — ATORVASTATIN CALCIUM 80 MG/1
80 TABLET, FILM COATED ORAL EVERY EVENING
Status: DISCONTINUED | OUTPATIENT
Start: 2022-04-05 | End: 2022-04-06 | Stop reason: HOSPADM

## 2022-04-05 RX ORDER — MECLIZINE HYDROCHLORIDE 25 MG/1
25 TABLET ORAL EVERY 12 HOURS PRN
Status: CANCELLED | OUTPATIENT
Start: 2022-04-05

## 2022-04-05 RX ADMIN — IOHEXOL 85 ML: 350 INJECTION, SOLUTION INTRAVENOUS at 13:36

## 2022-04-05 RX ADMIN — MECLIZINE HYDROCHLORIDE 25 MG: 25 TABLET ORAL at 17:17

## 2022-04-05 RX ADMIN — ATORVASTATIN CALCIUM 80 MG: 80 TABLET, FILM COATED ORAL at 17:17

## 2022-04-05 NOTE — ASSESSMENT & PLAN NOTE
PCP noticed right facial droop and sent patient to the hospital for a CVA work up  Patient nor his wife had noticed droop, and last known normal is unknown  Patient had also experienced intermittent episodes of vertigo over the past 4 days (starting on 4/2)  CTA head/neck negative  With exception of right lower facial droop, cranial nerves otherwise intact, romberg neg, cerebellar function intact      MRI brain 4/6 with negative for acute infarct    - Stroke protocol  - asprine 325 mg  - Lipitor 80 mg  - MRI brain  - Neuro Consulted  - PT/OT  - Speech/swallow eval  -a1c  -lipid panel

## 2022-04-05 NOTE — ASSESSMENT & PLAN NOTE
Patient reports intermittent episodes of vertigo starting 3 days prior to admission on 4/2  Episodes happen at random, including upon wakening  He has not fallen, but feels he needs to lay down or hold on to something to prevent loosing balance  He reports similar episodes about 3-4 months ago and he was found to have an ear infection at that time      Patient was asymptomatic with his vertigo throughout his hospital course    - meclizine 25 mg q8 transition to 25 mg q 8h p r n  for dizziness at discharge  - PT/OT - outpatient physical therapy referral  - CVA workup negative

## 2022-04-05 NOTE — ED PROVIDER NOTES
History  Chief Complaint   Patient presents with    Facial Droop     Pt states he began feeling dizzy on Saturday night  Reports "the room was spinning " Dizziness went away on  and returned  night  He made an appt with his doctor, Dr Glory Tyler, for today who told him his left side of his face was drooping and he should come to the ED  Pt has no dizziness right now  Last time he had it was 6-7am this morning   Dizziness     71yoM c/o dizziness like "the room spinning" since 2am Saturday night, intermittent, not currently present  Went to see his doctor today who noticed a left lower facial droop and sent to the ER  No prior hx of stroke  Prior to Admission Medications   Prescriptions Last Dose Informant Patient Reported? Taking?   esomeprazole (NexIUM) 20 mg capsule 2022 at Unknown time  Yes Yes   Sig: Take 20 mg by mouth every morning before breakfast   fluticasone (FLONASE) 50 mcg/act nasal spray Past Month at Unknown time  No Yes   Si sprays into each nostril daily   olopatadine (PATANOL) 0 1 % ophthalmic solution Past Month at Unknown time  No Yes   Sig: Administer 1 drop to both eyes 2 (two) times a day   tadalafil (CIALIS) 20 MG tablet Past Month at Unknown time  No Yes   Sig: Take 1 tablet (20 mg total) by mouth daily as needed for erectile dysfunction HE REQUESTS BRAND NAME  triamcinolone (KENALOG) 0 5 % cream Past Week at Unknown time  No Yes   Sig: Apply topically 2 (two) times a day Sparingly (short term) to affected area: finger tip      Facility-Administered Medications: None       History reviewed  No pertinent past medical history  History reviewed  No pertinent surgical history  History reviewed  No pertinent family history  I have reviewed and agree with the history as documented      E-Cigarette/Vaping     E-Cigarette/Vaping Substances     Social History     Tobacco Use    Smoking status: Never Smoker    Smokeless tobacco: Never Used   Substance Use Topics    Alcohol use: Not Currently    Drug use: Never       Review of Systems   Constitutional: Negative for fever  Respiratory: Negative for cough  Gastrointestinal: Negative for abdominal pain  Musculoskeletal: Negative for back pain  Neurological: Negative for headaches  All other systems reviewed and are negative  Physical Exam  Physical Exam  Vitals reviewed  Constitutional:       Appearance: He is well-developed  HENT:      Head: Normocephalic and atraumatic  Right Ear: External ear normal       Left Ear: External ear normal       Nose: Nose normal  No rhinorrhea  Mouth/Throat:      Mouth: Mucous membranes are moist    Eyes:      Conjunctiva/sclera: Conjunctivae normal    Cardiovascular:      Rate and Rhythm: Normal rate and regular rhythm  Pulmonary:      Effort: Pulmonary effort is normal       Breath sounds: Normal breath sounds  No wheezing or rales  Abdominal:      Palpations: Abdomen is soft  Tenderness: There is no abdominal tenderness  Musculoskeletal:      Cervical back: Neck supple  Right lower leg: No edema  Left lower leg: No edema  Skin:     General: Skin is warm and dry  Neurological:      Mental Status: He is alert and oriented to person, place, and time        Comments: Very slight flattening of nasolabial fold L face  No eye involvement  +past-pointing L hand finger-nose     Psychiatric:         Mood and Affect: Mood normal          Vital Signs  ED Triage Vitals   Temperature Pulse Respirations Blood Pressure SpO2   04/05/22 1201 04/05/22 1201 04/05/22 1201 04/05/22 1201 04/05/22 1201   (!) 97 °F (36 1 °C) 96 18 168/87 100 %      Temp Source Heart Rate Source Patient Position - Orthostatic VS BP Location FiO2 (%)   04/05/22 1201 04/05/22 1201 04/05/22 1201 04/05/22 1201 --   Tympanic Monitor Lying Right arm       Pain Score       04/05/22 1600       No Pain           Vitals:    04/05/22 1428 04/05/22 1500 04/05/22 1541 04/05/22 1541 BP: 145/84  124/89 124/89   Pulse: 81 76 76 76   Patient Position - Orthostatic VS: Held            Visual Acuity  Visual Acuity      Most Recent Value   L Pupil Size (mm) 3   R Pupil Size (mm) 3          ED Medications  Medications   pantoprazole (PROTONIX) EC tablet 20 mg (has no administration in time range)   atorvastatin (LIPITOR) tablet 80 mg (has no administration in time range)   aspirin chewable tablet 81 mg (has no administration in time range)   enoxaparin (LOVENOX) subcutaneous injection 40 mg (has no administration in time range)   iohexol (OMNIPAQUE) 350 MG/ML injection (SINGLE-DOSE) 85 mL (85 mL Intravenous Given 4/5/22 1336)       Diagnostic Studies  Results Reviewed     Procedure Component Value Units Date/Time    HS Troponin I 2hr [[id number]] Updated: 04/05/22 1522    Lab Status: No result Specimen: Blood from Arm, Left     HS Troponin I 4hr [[id number]]     Lab Status: No result Specimen: Blood     UA (URINE) with reflex to Scope [[id number]] Collected: 04/05/22 1400    Lab Status: Final result Specimen: Urine, Clean Catch Updated: 04/05/22 1407     Color, UA Light Yellow     Clarity, UA Clear     Specific Gravity, UA 1 010     pH, UA 8 0     Leukocytes, UA Negative     Nitrite, UA Negative     Protein, UA Negative mg/dl      Glucose, UA Negative mg/dl      Ketones, UA Negative mg/dl      Urobilinogen, UA 0 2 E U /dl      Bilirubin, UA Negative     Blood, UA Negative    COVID/FLU/RSV - 2 hour TAT [[id number]]  (Normal) Collected: 04/05/22 1259    Lab Status: Final result Specimen: Nares from Nasopharyngeal Swab Updated: 04/05/22 1345     SARS-CoV-2 Negative     INFLUENZA A PCR Negative     INFLUENZA B PCR Negative     RSV PCR Negative    Narrative:      FOR PEDIATRIC PATIENTS - copy/paste COVID Guidelines URL to browser: [email or web address]  N42x    SARS-CoV-2 assay is a Nucleic Acid Amplification assay intended for the  qualitative detection of nucleic acid from SARS-CoV-2 in nasopharyngeal  swabs  Results are for the presumptive identification of SARS-CoV-2 RNA  Positive results are indicative of infection with SARS-CoV-2, the virus  causing COVID-19, but do not rule out bacterial infection or co-infection  with other viruses  Laboratories within the [facility] and its  territories are required to report all positive results to the appropriate  public health authorities  Negative results do not preclude SARS-CoV-2  infection and should not be used as the sole basis for treatment or other  patient management decisions  Negative results must be combined with  clinical observations, patient history, and epidemiological information  This test has not been FDA cleared or approved  This test has been authorized by FDA under an Emergency Use Authorization  (EUA)  This test is only authorized for the duration of time the  declaration that circumstances exist justifying the authorization of the  emergency use of an in vitro diagnostic tests for detection of SARS-CoV-2  virus and/or diagnosis of COVID-19 infection under section 564(b)(1) of  the Act, 21 U  S C  404RUU-6(E)(7), unless the authorization is terminated  or revoked sooner  The test has been validated but independent review by FDA  and CLIA is pending  Test performed using [employer] GeneXpert: This RT-PCR assay targets N2,  a region unique to SARS-CoV-2  A conserved region in the E-gene was chosen  for pan-Sarbecovirus detection which includes SARS-CoV-2      HS Troponin 0hr (reflex protocol) [[id number]]  (Normal) Collected: 04/05/22 1256    Lab Status: Final result Specimen: Blood from Arm, Left Updated: 04/05/22 1328     hs TnI 0hr 5 ng/L     Comprehensive metabolic panel [[id number]] Collected: 04/05/22 1256    Lab Status: Final result Specimen: Blood from Arm, Left Updated: 04/05/22 1319     Sodium 142 mmol/L      Potassium 3 5 mmol/L      Chloride 104 mmol/L      CO2 28 mmol/L      ANION GAP 10 mmol/L      BUN 13 mg/dL      Creatinine 0 95 mg/dL      Glucose 132 mg/dL      Calcium 8 7 mg/dL      AST 17 U/L      ALT 33 U/L      Alkaline Phosphatase 77 U/L      Total Protein 7 9 g/dL      Albumin 3 6 g/dL      Total Bilirubin 0 43 mg/dL      eGFR 80 ml/min/1 73sq m     Narrative:      National Kidney Disease Foundation guidelines for Chronic Kidney Disease (CKD):     Stage 1 with normal or high GFR (GFR > 90 mL/min/1 73 square meters)    Stage 2 Mild CKD (GFR = 60-89 mL/min/1 73 square meters)    Stage 3A Moderate CKD (GFR = 45-59 mL/min/1 73 square meters)    Stage 3B Moderate CKD (GFR = 30-44 mL/min/1 73 square meters)    Stage 4 Severe CKD (GFR = 15-29 mL/min/1 73 square meters)    Stage 5 End Stage CKD (GFR <15 mL/min/1 73 square meters)  Note: GFR calculation is accurate only with a steady state creatinine    Protime-INR [[id number]]  (Normal) Collected: 04/05/22 1256    Lab Status: Final result Specimen: Blood from Arm, Left Updated: 04/05/22 1314     Protime 13 2 seconds      INR 1 02    APTT [[id number]]  (Normal) Collected: 04/05/22 1256    Lab Status: Final result Specimen: Blood from Arm, Left Updated: 04/05/22 1314     PTT 27 seconds     CBC and differential [[id number]]  (Abnormal) Collected: 04/05/22 1256    Lab Status: Final result Specimen: Blood from Arm, Left Updated: 04/05/22 1302     WBC 5 01 Thousand/uL      RBC 5 63 Million/uL      Hemoglobin 15 7 g/dL      Hematocrit 48 2 %      MCV 86 fL      MCH 27 9 pg      MCHC 32 6 g/dL      RDW 13 5 %      MPV 10 5 fL      Platelets 202 Thousands/uL      nRBC 0 /100 WBCs      Neutrophils Relative 62 %      Immat GRANS % 0 %      Lymphocytes Relative 27 %      Monocytes Relative 9 %      Eosinophils Relative 1 %      Basophils Relative 1 %      Neutrophils Absolute 3 08 Thousands/µL      Immature Grans Absolute 0 01 Thousand/uL      Lymphocytes Absolute 1 37 Thousands/µL      Monocytes Absolute 0 45 Thousand/µL      Eosinophils Absolute 0 05 Thousand/µL      Basophils Absolute 0 05 Thousands/µL                  CTA head and neck with and without contrast   Final Result by Apollo Miles MD (04/05 5771)      1  No acute intracranial CT abnormality  2   Nonspecific white matter change suggesting microangiopathy  3   Patent major vasculature of [place] without high-grade stenosis  No aneurysm  4   Unremarkable CT angiogram of the neck  5   Right sphenoid sinus mucosal thickening with air bubbles suggesting acute process  6   Periapical lucency of endodontic treated right maxillary molar tooth  Workstation performed: [id number]         XR chest 1 view portable   Final Result by Dana Meyer MD (04/05 9187)      No acute cardiopulmonary disease  Workstation performed: [id number]         MRI brain wo contrast    (Results Pending)              Procedures  Procedures         ED Course                               SBIRT 22yo+      Most Recent Value   SBIRT (24 yo +)    In order to provide better care to our patients, we are screening all of our patients for alcohol and drug use  Would it be okay to ask you these screening questions? No Filed at: 04/05/2022 1255                    [facility]  Number of Diagnoses or Management Options  Ataxia  Facial droop  Diagnosis management comments: NIHSS 2  Pt out of tpa window  CT/CTA neg    Admitted for MRI/neuro eval           Disposition  Final diagnoses:   Facial droop   Ataxia     Time reflects when diagnosis was documented in both MDM as applicable and the Disposition within this note     Time User Action Codes Description Comment    4/5/2022  2:46 PM Wilman Newness Add [R29 810] Facial droop     4/5/2022  2:46 PM Wilman Newness Add [R27 0] Ataxia       ED Disposition     ED Disposition Condition Date/Time Comment    Admit Stable Tue Apr 5, 2022 1446 Case was discussed with Dr Braydon York and the patient's admission status was agreed to be Admission Status: obsevation status to the service of Dr Mary Cox  Follow-up Information    None         Current Discharge Medication List      CONTINUE these medications which have NOT CHANGED    Details   esomeprazole (NexIUM) 20 mg capsule Take 20 mg by mouth every morning before breakfast      fluticasone (FLONASE) 50 mcg/act nasal spray 2 sprays into each nostril daily  Qty: 1 Bottle, Refills: 5    Associated Diagnoses: Seasonal allergic rhinitis, unspecified trigger      olopatadine (PATANOL) 0 1 % ophthalmic solution Administer 1 drop to both eyes 2 (two) times a day  Qty: 5 mL, Refills: 5    Associated Diagnoses: Allergic conjunctivitis of both eyes      tadalafil (CIALIS) 20 MG tablet Take 1 tablet (20 mg total) by mouth daily as needed for erectile dysfunction HE REQUESTS BRAND NAME  Qty: 10 tablet, Refills: 5    Associated Diagnoses: Erectile dysfunction, unspecified erectile dysfunction type      triamcinolone (KENALOG) 0 5 % cream Apply topically 2 (two) times a day Sparingly (short term) to affected area: finger tip  Qty: 15 g, Refills: 5    Associated Diagnoses: Fingertip eczema             No discharge procedures on file      PDMP Review     None          ED Provider  Electronically Signed by           Devin Alba DO  04/05/22 3083

## 2022-04-05 NOTE — H&P
History and Physical - [facility]    Patient Information: Kevyn Sigala 70 y o  male MRN: [id number]  Unit/Bed#: [address] [address] Encounter: [id number]  Admitting Physician: Rafaela Guerin MD  PCP: Tian Minor DO  Date of Admission:  04/05/22        Hospital Problem List:     Active Problems:    Stroke-like symptom    Vertigo    GERD (gastroesophageal reflux disease)      Assessment/Plan:    Stroke-like symptom  Assessment & Plan  PCP noticed right facial droop and sent patient to the hospital for a CVA work up  Patient nor his wife had noticed droop, and last known normal is unknown  Patient had also experienced intermittent episodes of vertigo over the past 4 days (starting on 4/2)  CTA head/neck negative  With exception of right lower facial droop, cranial nerves otherwise intact, romberg neg, cerebellar function intact  - Stroke protocol  - asprine 325 mg  - Lipitor 80 mg  - MRI brain  - Neuro Consulted  - PT/OT  - Speech/swallow eval  -a1c  -lipid panel        Vertigo  Assessment & Plan  Patient reports intermittent episodes of vertigo starting 3 days prior to admission on 4/2  Episodes happen at random, including upon wakening  He has not fallen, but feels he needs to lay down or hold on to something to prevent loosing balance  He reports similar episodes about 3-4 months ago and he was found to have an ear infection at that time  - meclizine 25 mg q8  - PT/OT  - see CVA workup    GERD (gastroesophageal reflux disease)  Assessment & Plan  Takes home med of nexium 20 mg    - substitute with protonix 20 mg          VTE Prophylaxis: Enoxaparin (Lovenox)  / sequential compression device   Code Status: Level 1 - Full Code    Anticipated Length of Stay:  Patient will be admitted on an Observation basis with an anticipated length of stay of  Less than 2 midnights     Justification for Hospital Stay: CVA    Total Time for Visit, including Counseling / Coordination of Care: 39 minutes  Greater than 50% of this total time spent on direct patient counseling and coordination of care  Chief Complaint:     Facial Droop (Pt states he began feeling dizzy on Saturday night  Reports "the room was spinning " Dizziness went away on Sunday and returned Sunday night  He made an appt with his doctor, Dr Husam Meyers, for today who told him his left side of his face was drooping and he should come to the ED  Pt has no dizziness right now  Last time he had it was 6-7am this morning ) and Dizziness    History of Present Illness:    Mary Mc is a 70 y o  male with a pmh of GERD who presents with a right facial droop  Since 3 days prior, patient has been having intermittent episodes of vertigo which will occur at random including while at rest   He has not fallen, but feels that he has to immediately laid down or hold on to something to prevent losing his balance  He saw his PCP earlier today to have this evaluated, and it was noticed that he had a right-sided facial droop and he was sent to the hospital   Patient and his wife had not notice the right-sided facial droop, last known normal unknown  Patient reported previous episodes of vertigo about 3-4 months ago at which time he was found to have an ear infection  Upon evaluation, patient had no vertigo or neurological symptoms beyond the facial droop  He denies any other associated symptoms  Review of Systems:    Review of Systems   Constitutional: Negative for chills, diaphoresis, fatigue and fever  HENT: Negative for congestion and sore throat  Eyes:        Recent cataract surgery in left eye   Respiratory: Negative for cough and shortness of breath  Cardiovascular: Negative for chest pain and palpitations  Gastrointestinal: Negative for abdominal pain, blood in stool, constipation, diarrhea, nausea and vomiting  Genitourinary: Negative for dysuria and hematuria     Musculoskeletal: Positive for arthralgias (Chronic right knee pain)    Neurological: Positive for dizziness and facial asymmetry  Negative for tremors, syncope, weakness, light-headedness, numbness and headaches  Psychiatric/Behavioral: Negative for agitation and confusion  Past Medical and Surgical History:     History reviewed  No pertinent past medical history  History reviewed  No pertinent surgical history  Meds/Allergies:    PTA meds:   Prior to Admission Medications   Prescriptions Last Dose Informant Patient Reported? Taking?   esomeprazole (NexIUM) 20 mg capsule 2022 at Unknown time  Yes Yes   Sig: Take 20 mg by mouth every morning before breakfast   fluticasone (FLONASE) 50 mcg/act nasal spray Past Month at Unknown time  No Yes   Si sprays into each nostril daily   olopatadine (PATANOL) 0 1 % ophthalmic solution Past Month at Unknown time  No Yes   Sig: Administer 1 drop to both eyes 2 (two) times a day   tadalafil (CIALIS) 20 MG tablet Past Month at Unknown time  No Yes   Sig: Take 1 tablet (20 mg total) by mouth daily as needed for erectile dysfunction HE REQUESTS BRAND NAME  triamcinolone (KENALOG) 0 5 % cream Past Week at Unknown time  No Yes   Sig: Apply topically 2 (two) times a day Sparingly (short term) to affected area: finger tip      Facility-Administered Medications: None       Allergies: No Known Allergies  History:     Marital Status: /Civil Union     Substance Use History:   Social History     Substance and Sexual Activity   Alcohol Use Not Currently     Social History     Tobacco Use   Smoking Status Never Smoker   Smokeless Tobacco Never Used     Social History     Substance and Sexual Activity   Drug Use Never       Family History:    History reviewed  No pertinent family history      Physical Exam:     Vitals:   Blood Pressure: 124/89 (22 1541)  Pulse: 76 (22 1541)  Temperature: 98 3 °F (36 8 °C) (22 1541)  Temp Source: Oral (22 1428)  Respirations: 18 (22 1428)  Height: 5' 9" (175 3 cm) (04/05/22 1201)  Weight - Scale: 81 2 kg (179 lb) (04/05/22 1201)  SpO2: 99 % (04/05/22 1541)    Physical Exam  Constitutional:       General: He is not in acute distress  Appearance: Normal appearance  HENT:      Head: Normocephalic and atraumatic  Mouth/Throat:      Mouth: Mucous membranes are moist       Pharynx: No oropharyngeal exudate or posterior oropharyngeal erythema  Eyes:      Extraocular Movements: Extraocular movements intact  Pupils: Pupils are equal, round, and reactive to light  Cardiovascular:      Rate and Rhythm: Normal rate and regular rhythm  Pulses: Normal pulses  Heart sounds: Normal heart sounds  Pulmonary:      Effort: Pulmonary effort is normal       Breath sounds: Normal breath sounds  Abdominal:      General: Bowel sounds are normal       Tenderness: There is no abdominal tenderness  Musculoskeletal:      Right lower leg: No edema  Left lower leg: No edema  Skin:     Capillary Refill: Capillary refill takes less than 2 seconds  Neurological:      Mental Status: He is alert and oriented to person, place, and time  Cranial Nerves: Cranial nerve deficit present  Motor: No weakness  Coordination: Romberg sign negative  Comments: Slight lower right facial droop with flattening of the nasolabial fold with groaning  Cranial nerves otherwise grossly intact  Psychiatric:         Mood and Affect: Mood normal          Behavior: Behavior normal                  Lab Results: I have personally reviewed pertinent reports        Results from last 7 days   Lab Units 04/05/22  1256   WBC Thousand/uL 5 01   HEMOGLOBIN g/dL 15 7   HEMATOCRIT % 48 2   PLATELETS Thousands/uL 211   NEUTROS PCT % 62   LYMPHS PCT % 27   MONOS PCT % 9   EOS PCT % 1     Results from last 7 days   Lab Units 04/05/22  1256   POTASSIUM mmol/L 3 5   CHLORIDE mmol/L 104   CO2 mmol/L 28   BUN mg/dL 13   CREATININE mg/dL 0 95   CALCIUM mg/dL 8 7   ALK PHOS U/L 77   ALT U/L 33   AST U/L 17     Results from last 7 days   Lab Units 04/05/22  1256   INR  1 02       Imaging: I have personally reviewed pertinent reports  CTA head and neck with and without contrast    Result Date: 4/5/2022  Narrative: CTA NECK AND BRAIN WITH AND WITHOUT CONTRAST INDICATION: vertigo facial droop COMPARISON:   None  TECHNIQUE:  Routine CT imaging of the Brain without contrast   Post contrast imaging was performed after administration of iodinated contrast through the neck and brain  Post contrast axial 0 625 mm images timed to opacify the arterial system  3D rendering was performed on an independent workstation  MIP reconstructions performed  Coronal reconstructions were performed of the noncontrast portion of the brain  Radiation dose length product (DLP) for this visit:  1303 mGy-cm   This examination, like all CT scans performed in the [facility], was performed utilizing techniques to minimize radiation dose exposure, including the use of iterative reconstruction and automated exposure control  IV Contrast:  85 mL of iohexol (OMNIPAQUE)  IMAGE QUALITY:   Diagnostic FINDINGS: NONCONTRAST BRAIN PARENCHYMA: Nonspecific  decreased attenuation involving periventricular and subcortical white matter, most compatible with moderate microangiopathic change  No intracranial mass, mass effect or midline shift  No CT signs of acute infarction  No acute parenchymal hemorrhage  VENTRICLES AND EXTRA-AXIAL SPACES:  Normal for the patient's age  VISUALIZED ORBITS AND PARANASAL SINUSES:  Orbits are unremarkable  Right sphenoid sinus mucosal thickening with air bubbles  CERVICAL VASCULATURE AORTIC ARCH AND GREAT VESSELS: Anatomic variant bovine arch configuration  Mild atherosclerotic calcification of aortic arch  Great vessel origins are patent without significant stenosis  RIGHT VERTEBRAL ARTERY CERVICAL SEGMENT:The vessel origin is unremarkable    The vessel is patent throughout the neck without high-grade stenosis  LEFT VERTEBRAL ARTERY CERVICAL SEGMENT: The vessel origin is unremarkable  The vessel is patent throughout the neck without high-grade stenosis  RIGHT EXTRACRANIAL CAROTID SEGMENT:The carotid bifurcation and cervical internal carotid artery are unremarkable  No stenosis or dissection  LEFT EXTRACRANIAL CAROTID SEGMENT: The carotid bifurcation and cervical internal carotid artery are unremarkable  No stenosis or dissection  INTRACRANIAL VASCULATURE INTERNAL CAROTID ARTERIES: The intracranial portions of the internal carotid arteries are unremarkable  Normal ophthalmic artery origins  ANTERIOR CIRCULATION:  Normal A1 segments  Bilateral anterior cerebral arteries are unremarkable  Normal anterior comminuting artery  MIDDLE CEREBRAL ARTERY CIRCULATION:  Bilateral M1 segments and major M2 branches are patent without high-grade stenosis  DISTAL VERTEBRAL ARTERIES:  Distal vertebral arteries are patent without high-grade stenosis  Posterior inferior cerebellar artery origins are patent  BASILAR ARTERY:  Basilar artery is unremarkable  There is proximal basilar artery/vertebrobasilar junction fenestration  Normal superior cerebellar arteries  POSTERIOR CEREBRAL ARTERIES:    Bilateral posterior cerebral arteries are patent without high-grade stenosis  Absent/hypoplastic posterior communicating arteries  DURAL VENOUS SINUSES:  Unremarkable  NON VASCULAR ANATOMY BONY STRUCTURES: Degenerative changes of cervical spine  No acute or aggressive appearing osseous abnormality  Periapical lucency of endodontic treated right maxillary molar tooth  SOFT TISSUES OF THE NECK: Thyroid nodules, the largest on the left measures 1 cm   According to guidelines published in the February 2015 white paper on incidental thyroid nodules in the Journal of the [employer] of Radiology [facility]), because the nodule is less than 1 5 cm in size and without suspicious feature, no further evaluation is recommended  THORACIC INLET:  Unremarkable  Impression: 1  No acute intracranial CT abnormality  2   Nonspecific white matter change suggesting microangiopathy  3   Patent major vasculature of [place] without high-grade stenosis  No aneurysm  4   Unremarkable CT angiogram of the neck  5   Right sphenoid sinus mucosal thickening with air bubbles suggesting acute process  6   Periapical lucency of endodontic treated right maxillary molar tooth  Workstation performed: [id number]     XR chest 1 view portable    Result Date: 4/5/2022  Narrative: CHEST INDICATION:   cva  COMPARISON:  None EXAM PERFORMED/VIEWS:  XR CHEST PORTABLE FINDINGS: Cardiomediastinal silhouette appears unremarkable  The lungs are clear  No pneumothorax or pleural effusion  Osseous structures appear within normal limits for patient age  Impression: No acute cardiopulmonary disease  Workstation performed: [id number]       CTA head and neck with and without contrast   Final Result      1  No acute intracranial CT abnormality  2   Nonspecific white matter change suggesting microangiopathy  3   Patent major vasculature of [place] without high-grade stenosis  No aneurysm  4   Unremarkable CT angiogram of the neck  5   Right sphenoid sinus mucosal thickening with air bubbles suggesting acute process  6   Periapical lucency of endodontic treated right maxillary molar tooth  Workstation performed: [id number]         XR chest 1 view portable   Final Result      No acute cardiopulmonary disease  Workstation performed: [id number]         MRI brain wo contrast    (Results Pending)       EKG, Pathology, and Other Studies Reviewed on Admission:   ·   ·     Allscripts/EPIC Records Reviewed: Yes     ** Please Note: "This note has been constructed using a voice recognition system  Therefore there may be syntax, spelling, and/or grammatical errors   Please call if you have any questions  "**

## 2022-04-06 ENCOUNTER — APPOINTMENT (OUTPATIENT)
Dept: RADIOLOGY | Facility: HOSPITAL | Age: 72
End: 2022-04-06
Payer: MEDICARE

## 2022-04-06 ENCOUNTER — TELEPHONE (OUTPATIENT)
Dept: FAMILY MEDICINE CLINIC | Facility: CLINIC | Age: 72
End: 2022-04-06

## 2022-04-06 VITALS
OXYGEN SATURATION: 93 % | BODY MASS INDEX: 26.51 KG/M2 | SYSTOLIC BLOOD PRESSURE: 147 MMHG | TEMPERATURE: 98 F | HEART RATE: 84 BPM | WEIGHT: 179 LBS | RESPIRATION RATE: 18 BRPM | HEIGHT: 69 IN | DIASTOLIC BLOOD PRESSURE: 92 MMHG

## 2022-04-06 PROBLEM — R29.90 STROKE-LIKE SYMPTOM: Status: RESOLVED | Noted: 2022-04-05 | Resolved: 2022-04-06

## 2022-04-06 PROBLEM — R42 VERTIGO: Status: RESOLVED | Noted: 2022-04-05 | Resolved: 2022-04-06

## 2022-04-06 LAB
ANION GAP SERPL CALCULATED.3IONS-SCNC: 9 MMOL/L (ref 4–13)
BUN SERPL-MCNC: 14 MG/DL (ref 5–25)
CALCIUM SERPL-MCNC: 8.6 MG/DL (ref 8.3–10.1)
CHLORIDE SERPL-SCNC: 105 MMOL/L (ref 100–108)
CHOLEST SERPL-MCNC: 202 MG/DL
CO2 SERPL-SCNC: 26 MMOL/L (ref 21–32)
CREAT SERPL-MCNC: 1.05 MG/DL (ref 0.6–1.3)
EST. AVERAGE GLUCOSE BLD GHB EST-MCNC: 166 MG/DL
GFR SERPL CREATININE-BSD FRML MDRD: 71 ML/MIN/1.73SQ M
GLUCOSE P FAST SERPL-MCNC: 137 MG/DL (ref 65–99)
GLUCOSE SERPL-MCNC: 137 MG/DL (ref 65–140)
HBA1C MFR BLD: 7.4 %
HDLC SERPL-MCNC: 47 MG/DL
LDLC SERPL CALC-MCNC: 142 MG/DL (ref 0–100)
POTASSIUM SERPL-SCNC: 3.6 MMOL/L (ref 3.5–5.3)
SODIUM SERPL-SCNC: 140 MMOL/L (ref 136–145)
TRIGL SERPL-MCNC: 64 MG/DL

## 2022-04-06 PROCEDURE — 92522 EVALUATE SPEECH PRODUCTION: CPT

## 2022-04-06 PROCEDURE — 97163 PT EVAL HIGH COMPLEX 45 MIN: CPT

## 2022-04-06 PROCEDURE — 80048 BASIC METABOLIC PNL TOTAL CA: CPT

## 2022-04-06 PROCEDURE — 99217 PR OBSERVATION CARE DISCHARGE MANAGEMENT: CPT | Performed by: FAMILY MEDICINE

## 2022-04-06 PROCEDURE — 70551 MRI BRAIN STEM W/O DYE: CPT

## 2022-04-06 PROCEDURE — 97530 THERAPEUTIC ACTIVITIES: CPT

## 2022-04-06 PROCEDURE — 99205 OFFICE O/P NEW HI 60 MIN: CPT | Performed by: PSYCHIATRY & NEUROLOGY

## 2022-04-06 PROCEDURE — G1004 CDSM NDSC: HCPCS

## 2022-04-06 PROCEDURE — 80061 LIPID PANEL: CPT

## 2022-04-06 RX ORDER — ATORVASTATIN CALCIUM 20 MG/1
20 TABLET, FILM COATED ORAL DAILY
Qty: 30 TABLET | Refills: 6 | Status: SHIPPED | OUTPATIENT
Start: 2022-04-06

## 2022-04-06 RX ORDER — MECLIZINE HYDROCHLORIDE 25 MG/1
25 TABLET ORAL EVERY 8 HOURS PRN
Qty: 30 TABLET | Refills: 0 | Status: SHIPPED | OUTPATIENT
Start: 2022-04-06

## 2022-04-06 RX ORDER — ATORVASTATIN CALCIUM 20 MG/1
20 TABLET, FILM COATED ORAL DAILY
Qty: 30 TABLET | Refills: 0 | Status: SHIPPED | OUTPATIENT
Start: 2022-04-06 | End: 2022-04-06 | Stop reason: HOSPADM

## 2022-04-06 RX ADMIN — MECLIZINE HYDROCHLORIDE 25 MG: 25 TABLET ORAL at 08:58

## 2022-04-06 RX ADMIN — ENOXAPARIN SODIUM 40 MG: 40 INJECTION SUBCUTANEOUS at 08:58

## 2022-04-06 RX ADMIN — MECLIZINE HYDROCHLORIDE 25 MG: 25 TABLET ORAL at 01:00

## 2022-04-06 RX ADMIN — ASPIRIN 81 MG CHEWABLE TABLET 324 MG: 81 TABLET CHEWABLE at 08:58

## 2022-04-06 RX ADMIN — PANTOPRAZOLE SODIUM 20 MG: 20 TABLET, DELAYED RELEASE ORAL at 05:33

## 2022-04-06 NOTE — CASE MANAGEMENT
Case Management Assessment & Discharge Planning Note    Patient name Darian Garcia  Location [address] Bowman-* MRN [id number]  : 1950 Date 2022       Current Admission Date: 2022  Current Admission Diagnosis:Stroke-like symptom   Patient Active Problem List    Diagnosis Date Noted    Stroke-like symptom 2022    Vertigo 2022    Hiatal hernia with GERD 2022    Fingertip eczema 2021    [place] cardiac risk 10-20% in next 10 years 2021    Seasonal allergic rhinitis 2021    Screening for cardiovascular, respiratory, and genitourinary diseases 2021    Screening for diabetes mellitus (DM) 2021    Prostate cancer screening 2021    Colon cancer screening 2021    Medicare annual wellness visit, subsequent 2021    Allergic conjunctivitis of both eyes 2021    Impacted cerumen of right ear 2021    Primary osteoarthritis of right shoulder 12/10/2020    Dyslipidemia 2016    Hyperpigmentation of skin 10/23/2012    ED (erectile dysfunction) 2011    GERD (gastroesophageal reflux disease) 2011      LOS (days): 0  Geometric Mean LOS (GMLOS) (days):   Days to GMLOS:     OBJECTIVE:        Current admission status: Observation  Referral Reason: Stroke    Preferred Pharmacy:   [facility] [place] [address]  [address] [place] [address],  [phone]  Phone: [phone] Fax: [phone]    Primary Care Provider: Ab Aguilar DO    Primary Insurance: MEDICARE  Secondary Insurance:     ASSESSMENT:  Don 26 Proxies    There are no active Health Care Proxies on file          Obs Notice Signed: 22 (Notice given by registration per chart)    Readmission Root Cause  30 Day Readmission: No    Patient Information  Admitted from[de-identified] Other (comment) (Sent to ED from PCP's office)  Mental Status: Alert  During Assessment patient was accompanied by: Not accompanied during assessment  Assessment information provided by[de-identified] Patient  Primary Caregiver: Self  Support Systems: Spouse/significant other  [place] of Residence: [address] [place] [address] [place] do you live in?: [address] access options   Select all that apply : Stairs  Number of steps to enter home : 2  Type of Current Residence: [address] [place] home  Upon entering residence, is there a bedroom on the main floor (no further steps)?: Yes (Patient stated that he is able to sleep in the living room if needed)  Upon entering residence, is there a bathroom on the main floor (no further steps)?: Yes (Half bath on first floor)  In the last 12 months, was there a time when you were not able to pay the mortgage or rent on time?: No  In the last 12 months, how many places have you lived?: 1  In the last 12 months, was there a time when you did not have a steady place to sleep or slept in a shelter (including now)?: No  Homeless/housing insecurity resource given?: N/A  Living Arrangements: Lives w/ Spouse/significant other  Is patient a ?: No    Activities of Daily Living Prior to Admission  Functional Status: Independent  Completes ADLs independently?: Yes  Ambulates independently?: Yes  Does patient use assisted devices?: No  Does patient currently own DME?: No  Does patient have a history of Outpatient Therapy (PT/OT)?: Yes  Does the patient have a history of Short-Term Rehab?: No  Does patient have a history of HHC?: No  Does patient currently have [place] ?: No    Patient Information Continued  Income Source: Pension/USP  Does patient have prescription coverage?: Yes  Within the past 12 months, you worried that your food would run out before you got the money to buy more : Never true  Within the past 12 months, the food you bought just didnt last and you didnt have money to get more : Never true  Food insecurity resource given?: N/A  Does patient receive dialysis treatments?: No  Does patient have a history of substance abuse?: No  Does patient have a history of Mental Health Diagnosis?: No    Means of Transportation  Means of Transport to Appts[de-identified] Drives Self  In the past 12 months, has lack of transportation kept you from medical appointments or from getting medications?: No  In the past 12 months, has lack of transportation kept you from meetings, work, or from getting things needed for daily living?: No  Was application for public transport provided?: N/A    DISCHARGE DETAILS:    Discharge planning discussed with[de-identified] Patient  Freedom of Choice: Yes  Comments - Freedom of Choice: FOC reviewed with patient  SW will review again if treatment recommendations are made  CM contacted family/caregiver?: Yes (Voice mail left for wife Vicky Dixon [phone])     Contacts  Patient Contacts: Burke Pierre (spouse)  Relationship to Patient[de-identified] Family  Contact Method: Phone  Phone Number: [phone]  Reason/Outcome: Emergency Contact    Other Referral/Resources/Interventions Provided:  Interventions: None Indicated    Would you like to participate in our [address] service program?  : No - Declined    SW spoke with patient at bedside to introduce role of CM and obtain assessment information  Patient lives in a 2-story home with his wife and is independent with all needs at baseline  Patient drives and uses no DME  He stated that his wife is a nurse  SW left a voice mail message for wife Vicky Dixon with contact information  Patient has not yet been medically cleared for discharge and PT/OT evaluations are pending at this time  Patient was seen by SLP this morning and per note there are no speech therapy needs  SW will continue to follow for discharge planning needs

## 2022-04-06 NOTE — PHYSICAL THERAPY NOTE
PHYSICAL THERAPY EVALUATION/TREATMENT    Pt is at his baseline level of function as pt remains independent with ADLs and ambulation  Pt does have BPPV, recommend OP follow up at the [address] [place] [address]  04/06/22 1010   Note Type   Note type Evaluation   Pain Assessment   Pain Assessment Tool 0-10   Pain Score No Pain   Home Living   Type of Home House   Home Layout Two level   Prior Function   Level of [place] Independent with ADLs and functional mobility   Lives With Spouse   ADL Assistance Independent   General   Additional Pertinent History Pt admitted with intermittent dizziness especially when rolling over in bed  MRI negative for CVA  Cognition   Overall Cognitive Status WFL   Subjective   Subjective I get dizzy when I rollover in bed   RUE Assessment   RUE Assessment WNL   LUE Assessment   LUE Assessment WNL   RLE Assessment   RLE Assessment WNL   LLE Assessment   LLE Assessment WNL   Vestibular   Spontaneous Nystagmus (-) no evidence of nystagmus at rest in room light   Vestibular Comments positive Ian Halpike test for L posterior canal BPPV  with torsional upbeating nstagmus and vertigo lasting 30 seconds  L Epley performed  Coordination   Movements are Fluid and Coordinated 1   Bed Mobility   Rolling R 7  Independent   Rolling L 7  Independent   Supine to Sit 7  Independent   Sit to Supine 7  Independent   Transfers   Sit to Stand 7  Independent   Stand to Sit 7  Independent   Stand pivot 7  Independent   Ambulation/Elevation   Gait pattern R Foot drag   Gait Assistance 7  Independent   Assistive Device L platform   Distance 200 feet   Balance   Static Sitting Good   Dynamic Sitting Good   Static Standing Good   Dynamic Standing Good   Ambulatory Good   Activity Tolerance   Activity Tolerance Patient limited by fatigue;Patient tolerated treatment well   Assessment   Prognosis Good   Problem List   (dizziness)   Assessment Patient seen for Physical Therapy evaluation   Patient admitted with Stroke-like symptom  Comorbidities affecting patient's physical performance include: vertigo  Personal factors affecting patient at time of initial evaluation include: none  Prior to admission, patient was independent with functional mobility without assistive device and independent with ADLS  Please find objective findings from Physical Therapy assessment regarding body systems outlined above with impairments and limitations including impaired balance  The Barthel Index was used as a functional outcome tool presenting with a score of Barthel Index Score: 100 today indicating no limitations of functional mobility and ADLS  Patient's clinical presentation is currently unstable/unpredictable as seen in patient's presentation of vertigo  Pt would benefit from continued Physical Therapy treatment to address deficits as defined above and maximize level of functional mobility  As demonstrated by objective findings, the assigned level of complexity for this evaluation is high  The patient's AM-PAC Basic Mobility Inpatient Short Form Raw Score is 24  A Raw score of greater than 16 suggests the patient may benefit from discharge to home      Goals   Patient Goals "no dizziness"   [facility] Expiration Date 04/13/22   Short Term Goal #1 no dizziness, resolution of BPPV   Short Term Goal #2 follow up at balance center if dizziness does not resolve   Plan   Treatment/Interventions   (Treatment for BPPV)   PT Frequency Other (Comment)  (5w)   Recommendation   PT Discharge Recommendation Home with outpatient rehabilitation  (balance center)   Jose 8 in Bed Without Bedrails 4   Lying on Back to Sitting on Edge of Flat Bed 4   Moving Bed to Chair 4   Standing Up From Chair 4   Walk in Room 4   Climb 3-5 Stairs 4   Basic Mobility Inpatient Raw Score 24   Basic Mobility Standardized Score 57 68   Highest Level Of Mobility   JH-HLM Goal 8: Walk 250 feet or more   JH-HLM Highest Level of Mobility 8: Walk 250 feet ot more   -HLM Goal Achieved Yes   Barthel Index   Feeding 10   Bathing 5   Grooming Score 5   Dressing Score 10   Bladder Score 10   Bowels Score 10   Toilet Use Score 10   Transfers (Bed/Chair) Score 15   Mobility (Level Surface) Score 15   Stairs Score 10   Barthel Index Score 100   Additional Treatment Session   Start Time 1000   End Time 1010   Treatment Assessment S "I get dizzy rolling over in bed" O:  see above notes, Epley performed A:  Pt tolerated treatment well    P: recommend OP Balance center follow up   [address] Number  Shanda Malolry PT [id number]

## 2022-04-06 NOTE — CONSULTS
[address]   Neurology Initial Consult    Ro Anderson is a 70 y o  male  [address] [facility] [address] [place]-*          Information obtained from:   Chief Complaint   Patient presents with    Facial Droop     Pt states he began feeling dizzy on Saturday night  Reports "the room was spinning " Dizziness went away on Sunday and returned Sunday night  He made an appt with his doctor, Dr Mala Delarosa, for today who told him his left side of his face was drooping and he should come to the ED  Pt has no dizziness right now  Last time he had it was 6-7am this morning   Dizziness         Assessment/Plan:    1  BPPV  2  HTN  3  HLD  4  DM    -Monitored on Telemetry  -Aspirin 325mg daily  -Lipitor 20mg daily  -meclizine 25mg q8hrs prn vertigo  -MRI of the Brain completed  -Echo with shunt study  -deferred, MRI neg for CVA  -Labs: Lipid profile and A1C  -PT/OT   -Recommendations for OP referral to [facility]  -Pt with high LDL and A1C, pt is a high vascular risk for CVA  Recommend pt continue to follow up with his PCP for vascular risk management    Pt is a 72yr old male with minimal medical history who had a couple of bouts of vertigo over this past weekend  Pt reported a h/o this about 4 months ago due to ear infection and cerumen impaction  He stated that [facility] and was advised that he had a rt facial droop and was directed to the ER  Pt has slightly elevated BP in the ED and CTA of the head and neck was negative for acute findings  Pt NIH score in the ER was reportedly 2  Pt was admitted for further work up  Pt had no focal deficits today on exam   He and his wife reported noted that he is baseline with his facial appearance  He had no ataxia, weakness, paresthesia and reflexes are 2+ throughout  Pt did have a slight leftward nystagmus, although minimal and did not have associated vertigo with it     Pt did have bout of elicited vertigo with DHP, noted positive Lt posterior canal BPPV for which Epley was done  Pt had no event after this, symptoms resolved with the Epley  Pt having no central etiology, eval of pt senses and has no visual deficits  No reports of hearing changes or fullness  Slight tinnitus noted  Pt has no findings of infection, no cerumen and TM is gray and intact  Pt can take Meclizine 25mg q8hrs as needed for persistent vertigo  Pt does have vascular risk factors, recommendations for continued ASA and statin therapies and follow up with his PCP for management  NIHSS:  1a Level of Consciousness: 0 = Alert   1b  LOC Questions: 0 = Answers both correctly   1c  LOC Commands: 0 = Obeys both correctly   2  Best Gaze: 0 = Normal   3  Visual: 0 = No visual field loss   4  Facial Palsy: 0=Normal symmetric movement   5a  Motor Right Arm: 0=No drift, limb holds 90 (or 45) degrees for full 10 seconds   5b  Motor Left Arm: 0=No drift, limb holds 90 (or 45) degrees for full 10 seconds   6a  Motor Right Le=No drift, limb holds 90 (or 45) degrees for full 10 seconds   6b  Motor Left Le=No drift, limb holds 90 (or 45) degrees for full 10 seconds   7  Limb Ataxia:  0=Absent   8  Sensory: 0=Normal; no sensory loss   9  Best Language:  0=No aphasia, normal   10  Dysarthria: 0=Normal articulation   11  Extinction and Inattention (formerly Neglect): 0=No abnormality   Total Score: 0     Time NIHSS was completed: 1200pm          Kateryna Perkins will need follow up in 8-10 weeks with general attending or advance practitioner  He will not require outpatient neurological testing  HPI:  Kateryna Perkins is a 72yr old male with PMH of GERD and prior Vertigo experience who was brought to the ED after being told he had a Rt facial droop  Pt reported having a room spinning around him episode on Saturday that was transient and resolved after taking meclizine  He noted an additional episode on     Pt reported having had similar symptoms in the past, noted was related to cerumen impaction and ear infection  Pt was concerned so went to the Urgent care for evaluation  While at urgent care, pt provider noted a Rt sided facial droop in the nasolabial fold of the mouth  He was then transferred to the hospital for further evaluation  On arrival to the ED, pt had BP of 168/87, slight flattening of the Rt mouth and NIH score of 2  Pt CTA was negative for LVO, stenosis or aneurysm  He had mild athero in external carotids  Pt was admitted for further eval of dizziness and rule out CVA  Pt was taken for MRI this am, noted no acute infarction with moderate chronic microangiopathy   and A1C 7 4  Pt exam without any focal deficits  Pt had indicated that his facial appearance is his baseline  Pt reported no episodes of vertigo since admission  Pt was seen by PT and had evident nystagmus  PT reported + DHP for Lt posterior canal BPPV with torsional upbeating nystagmus and vertigo lasting 30 sec  Lt sided Epley was performed  Pt had no further vertigo post procedure  Minimal nystagmus noted on this examiners neuro exam post PT sesssion  Pt reported that his 1st episode was about 4 months ago and had ear infection and impaction in his Rt ear  He noted that episode was worse than his recent dizziness  He has no headache, nausea associated with this vertigo and had less imbalance with walking  Pt denies any headache, lightheadedness, vision changes, speech or swallowing changes or falls  He noted no recent acute illness, fever, chills, medication changes or head injury  Pt denied ear pain or pressure  He noted some tinnitus on occasion at baseline but not currently  He did note a couple of days ago with an increased pulsation sensation in his Rt ear that was also transient  Eval of pts ears done, no cerumen noted, TM are gray and intact  No erythema of fluid noted        Pt without central source, noted BPPV that was reproduced and reset in PT  Pt can use meclizine in the event of additional episodes  Recommend pt follow up in the [address] [place] [address] for further Vestibular rehab  May need to be seen by ENT in the future if recurrent of symptoms progress  History reviewed  No pertinent past medical history  History reviewed  No pertinent surgical history  No Known Allergies      Current Facility-Administered Medications:     aspirin chewable tablet 324 mg, 324 mg, Oral, Daily, Shanon Drummond DO, 324 mg at 04/06/22 0858    atorvastatin (LIPITOR) tablet 80 mg, 80 mg, Oral, QPM, Alan Garcia MD, 80 mg at 04/05/22 1717    enoxaparin (LOVENOX) subcutaneous injection 40 mg, 40 mg, Subcutaneous, Daily, Alan Garcia MD, 40 mg at 04/06/22 0858    meclizine (ANTIVERT) tablet 25 mg, 25 mg, Oral, Q8H [address], Alan Garcia MD, 25 mg at 04/06/22 0858    pantoprazole (PROTONIX) EC tablet 20 mg, 20 mg, Oral, Early Morning, Alan Garcia MD, 20 mg at 04/06/22 0533    Social History     Socioeconomic History    Marital status: /Civil Union     Spouse name: Not on file    Number of children: Not on file    Years of education: Not on file    Highest education level: Not on file   Occupational History    Not on file   Tobacco Use    Smoking status: Never Smoker    Smokeless tobacco: Never Used   Substance and Sexual Activity    Alcohol use: Not Currently    Drug use: Never    Sexual activity: Not on file   Other Topics Concern    Not on file   Social History Narrative    Not on file     Social Determinants of Health     Financial Resource Strain: Not on file   Food Insecurity: No Food Insecurity    Worried About Running Out of Food in the Last Year: Never true    Kristofer of Food in the Last Year: Never true   Transportation Needs: No Transportation Needs    Lack of Transportation (Medical): No    Lack of Transportation (Non-Medical):  No   Physical Activity: Not on file   Stress: Not on file   Social Connections: Not on file   Intimate Partner Violence: Not on file   Housing Stability: [address] Unable to Pay for Housing in the Last Year: No    Number of Places Lived in the Last Year: 1    Unstable Housing in the Last Year: No       History reviewed  No pertinent family history  Review of systems:  Please see HPI for positive symptoms  Constitutional: No fever, no chills, no weight change  Ocular: No diplopia, no blurred vision, spots/zigzag lines  HEENT:  No sore throat, headache or congestion  COR:  No chest pain  No palpitations  Lungs:  no sob  GI:  no  nausea, no vomiting, no diarrhea, no constipation, no anorexia  :  No dysuria, frequency, or urgency  No hematuria  Musculoskeletal:  No joint pain or swelling   Skin:  No rash or itching  Psychiatric:  no anxiety, no depression  Endocrine:  No polyuria or polydipsia  Physical examination:  /69 (BP Location: Right arm)   Pulse 67   Temp 98 °F (36 7 °C) (Oral)   Resp 18   Ht 5' 9" (1 753 m)   Wt 81 2 kg (179 lb)   SpO2 97%   BMI 26 43 kg/m²     GENERAL APPEARANCE:  The patient is alert, oriented  HEENT:  Head is normocephalic  Pupils are equal and reactive  TM gray, no cerumen seen bilaterally  NECK:  Supple without lymphadenopathy  HEART:  Regular rate and rhythm  LUNGS:  clear to auscultation  No crackles or wheezes are heard  ABDOMEN:  Soft, nontender, nondistended with good bowel sounds heard  EXTREMITIES:  Without cyanosis, clubbing or edema  Mental status: The patient is alert, attentive, and oriented  Speech is clear and fluent, good repetition, comprehension, and naming  Cranial nerves:  CN II: Visual fields are full to confrontation  Fundoscopic exam is normal with sharp discs and no vascular changes  Pupils are 3 mm and briskly reactive to light  CN III, IV, VI: At primary gaze, there is no eye deviation     Minimal nystagmus to left horizontal gaze  CN V: Facial sensation is intact to pinprick in all 3 divisions bilaterally  Corneal responses are intact  CN VII: Face is symmetric with normal eye closure and smile  CN VIII: Hearing is normal to rubbing fingers  CN IX, X: Palate elevates symmetrically  Phonation is normal   CN XI: Head turning and shoulder shrug are intact  CN XII: Tongue is midline with normal movements and no atrophy  Motor: There is no pronator drift of out-stretched arms  Muscle bulk and tone are normal    Muscle exam  Arm Right Left Leg Right Left   Deltoid 5/5 5/5 Iliopsoas 5/5 5/5   Biceps 5/5 5/5 Quads 5/5 5/5   Triceps 5/5 5/5 Hamstrings 5/5 5/5   Wrist Extension 5/5 5/5 Ankle Dorsi Flexion 5/5 5/5   Wrist Flexion 5/5 5/5 Ankle Plantar Flexion 5/5 5/5        Reflexes    RJ BJ TJ KJ AJ Plantars Oconnell's   Right 2+ 2+ 2+ 2+ 2+ Downgoing Not present   Left 2+ 2+ 2+ 2+ 2+ Downgoing Not present      Sensory:  Light touch, Temperature, position sense, and vibration sense are intact in fingers and toes  Coordination:  Rapid alternating movements and fine finger movements are intact  There is no dysmetria on finger-to-nose and heel-knee-shin  There are no abnormal or extraneous movements  Romberg negative  Gait/Stance:  Normal heel/toe walking and tandem gait  Lab Results   Component Value Date    WBC 5 01 04/05/2022    HGB 15 7 04/05/2022    HCT 48 2 04/05/2022    MCV 86 04/05/2022     04/05/2022     No results found for: HGBA1C  Lab Results   Component Value Date    ALT 33 04/05/2022    AST 17 04/05/2022    ALKPHOS 77 04/05/2022     Lab Results   Component Value Date    CALCIUM 8 6 04/06/2022    K 3 6 04/06/2022    CO2 26 04/06/2022     04/06/2022    BUN 14 04/06/2022    CREATININE 1 05 04/06/2022         Review of reports and notes reveal:  Independent Interpretation of images or specimens:  CTA head and neck with and without contrast  Result Date: 4/5/2022  1  No acute intracranial CT abnormality     2  Nonspecific white matter change suggesting microangiopathy  3   Patent major vasculature of [place] without high-grade stenosis  No aneurysm  4   Unremarkable CT angiogram of the neck  5   Right sphenoid sinus mucosal thickening with air bubbles suggesting acute process  6   Periapical lucency of endodontic treated right maxillary molar tooth  Workstation performed: [id number]     XR chest 1 view portable  Result Date: 4/5/2022  No acute cardiopulmonary disease  Workstation performed: [id number]           Thank you for this consult  Total time of encounter: 70 Minutes  More than 50% of time was spent in counseling and coordination of care of patient  Discussion of MRI, echo, oral medications added and PT/OT needs    Review of studies, including MRI and CT

## 2022-04-06 NOTE — SPEECH THERAPY NOTE
SLP Motor Speech Evaluation      Patient Name: April Hooks    [id number]'B Date: 4/6/2022     Problem List  Active Problems:    GERD (gastroesophageal reflux disease)    Stroke-like symptom    Vertigo    Past Medical History  History reviewed  No pertinent past medical history  Past Surgical History  History reviewed  No pertinent surgical history  Impressions:  Pt presented with no dysphonia, no significant dysarthria (or dysphagia in screening) this am      Evaluation only;  no therapy indicated  HISTORY AND PHYSICAL:     April Hooks is a 70 y o  male with a pmh of GERD who presents with recent episodes of dizziness and concern for left facial droop  DX:  R/o CVA  MRI pending      Speech and Swallowing Mechanism Exam   Facial: ?trace L facial weakness  Labial: WFL  Lingual: WFL  Velum: symmetrical  Mandible: adequate ROM  Dentition: adequate  Respiratory Support: on RA/no support    Respiration and Phonation  Maximum Phonation Time  (Normal 15-20 seconds for healthy older adult with standard deviation of 5 5-6):  10  Able to sustain phonation counting from 1 to 30  Sustains phonation across words, phrases, sentences and in normal conversational speech  Vocal Quality:  clear/adequate     Articulation:  Single Syllable Words: precise articulation  Multisyllabic Words: precise articulation  Phrase Level:precise articulation  Sentence Level:  precise articulation  Conversational Speech:  precise articulation    Diadochokinesis:   puh tuh kuh (pattycake or buttercup)- (normal should exceed 8 reps in 10 seconds) WNL    Resonation: Normal nasality    S/S Oral apraxia:  None    S/S Verbal Apraxia:  None      INTELLIGIBILITY in CONVERSATIONAL SPEECH:  Min reduced by accent (pt from [facility])    Conversation:  Occasion of anomia    Jaimecody Hernandezer [address] [place] [address] N [phone]

## 2022-04-06 NOTE — TELEPHONE ENCOUNTER
----- Message from Megan Godinez DO sent at 4/6/2022  2:57 PM EDT -----  Regarding: RE: Tanika Oneill  Thank you for allowing us to participate in the care of your patient, Shara Lopez, who was hospitalized from 4/5/2022 through 4/6/2022 with the admitting diagnosis of vertigo which resolved after meclizine  MRI negative for stroke  Started on low-dose statin for hyperlipidemia      If you have any additional questions or would like to discuss further, please feel free to contact me      Mauro Hoffman [facility] Internal Medicine, Hospitalist  [phone]

## 2022-04-06 NOTE — PLAN OF CARE
Problem: Neurological Deficit  Goal: Neurological status is stable or improving  Description: Interventions:  - Monitor and assess patient's level of consciousness, motor function, sensory function, and level of assistance needed for ADLs  - Monitor and report changes from baseline  Collaborate with interdisciplinary team to initiate plan and implement interventions as ordered  - Provide and maintain a safe environment  - Consider seizure precautions  - Consider fall precautions  - Consider aspiration precautions  - Consider bleeding precautions  Outcome: Adequate for Discharge     Problem: Activity Intolerance/Impaired Mobility  Goal: Mobility/activity is maintained at optimum level for patient  Description: Interventions:  - Assess and monitor patient  barriers to mobility and need for assistive/adaptive devices  - Assess patient's emotional response to limitations  - Collaborate with interdisciplinary team and initiate plans and interventions as ordered  - Encourage independent activity per ability   - Maintain proper body alignment  - Perform active/passive rom as tolerated/ordered  - Plan activities to conserve energy   - Turn patient as appropriate  Outcome: Adequate for Discharge     Problem: Communication Impairment  Goal: Ability to express needs and understand communication  Description: Assess patient's communication skills and ability to understand information  Patient will demonstrate use of effective communication techniques, alternative methods of communication and understanding even if not able to speak  - Encourage communication and provide alternate methods of communication as needed  - Collaborate with case management/ for discharge needs  - Include patient/family/caregiver in decisions related to communication    Outcome: Adequate for Discharge     Problem: Potential for Aspiration  Goal: Non-ventilated patient's risk of aspiration is minimized  Description: Assess and monitor vital signs, respiratory status, and labs (WBC)  Monitor for signs of aspiration (tachypnea, cough, rales, wheezing, cyanosis, fever)  - Assess and monitor patient's ability to swallow  - Place patient up in chair to eat if possible  - HOB up at 90 degrees to eat if unable to get patient up into chair   - Supervise patient during oral intake  - Instruct patient/ family to take small bites  - Instruct patient/ family to take small single sips when taking liquids  - Follow patient-specific strategies generated by speech pathologist   Outcome: Adequate for Discharge     Problem: Nutrition  Goal: Nutrition/Hydration status is improving  Description: Monitor and assess patient's nutrition/hydration status for malnutrition (ex- brittle hair, bruises, dry skin, pale skin and conjunctiva, muscle wasting, smooth red tongue, and disorientation)  Collaborate with interdisciplinary team and initiate plan and interventions as ordered  Monitor patient's weight and dietary intake as ordered or per policy  Utilize nutrition screening tool and intervene per policy  Determine patient's food preferences and provide high-protein, high-caloric foods as appropriate  - Assist patient with eating   - Allow adequate time for meals   - Encourage patient to take dietary supplement as ordered  - Collaborate with clinical nutritionist   - Include patient/family/caregiver in decisions related to nutrition    Outcome: Adequate for Discharge

## 2022-04-06 NOTE — DISCHARGE SUMMARY
Discharge Summary - [facility] Internal Medicine    Patient Information: Sagrario Marcano 70 y o  male MRN: [id number]  Unit/Bed#: [address] [address] Encounter: [id number]    Discharging Physician / Practitioner: Vicky Brenner MD  PCP: Alan Gee DO  Admission Date: 4/5/2022  Discharge Date: 04/06/22    Reason for Admission: Facial Droop (Pt states he began feeling dizzy on Saturday night  Reports "the room was spinning " Dizziness went away on Sunday and returned Sunday night  He made an appt with his doctor, Dr Amanda Olson, for today who told him his left side of his face was drooping and he should come to the ED  Pt has no dizziness right now  Last time he had it was 6-7am this morning ) and Dizziness      Discharge Diagnoses:     Principal Problem (Resolved):    Stroke-like symptom  Active Problems:    Vertigo    GERD (gastroesophageal reflux disease)    Dyslipidemia        Vertigo  Assessment & Plan  Patient reports intermittent episodes of vertigo starting 3 days prior to admission on 4/2  Episodes happen at random, including upon wakening  He has not fallen, but feels he needs to lay down or hold on to something to prevent loosing balance  He reports similar episodes about 3-4 months ago and he was found to have an ear infection at that time  Patient was asymptomatic with his vertigo throughout his hospital course    - meclizine 25 mg q8 transition to 25 mg q 8h p r n  for dizziness at discharge  - PT/OT - outpatient physical therapy referral  - CVA workup negative    * Stroke-like symptom-resolved as of 4/6/2022  Assessment & Plan  PCP noticed right facial droop and sent patient to the hospital for a CVA work up  Patient nor his wife had noticed droop, and last known normal is unknown  Patient had also experienced intermittent episodes of vertigo over the past 4 days (starting on 4/2)  CTA head/neck negative    With exception of right lower facial droop, cranial nerves otherwise intact, romberg neg, cerebellar function intact  MRI brain 4/6 with negative for acute infarct    - Stroke protocol  - asprine 325 mg  - Lipitor 80 mg  - MRI brain  - Neuro Consulted  - PT/OT  - Speech/swallow eval  -a1c  -lipid panel        GERD (gastroesophageal reflux disease)  Assessment & Plan  Takes home med of nexium 20 mg    - substitute with protonix 20 mg    Dyslipidemia  Assessment & Plan  Lipid panel 4/5: Chol 202, HDL 47, LDL calc 142, Tri 64    - started on Lipitor 20 mg to be continued outpatient  - PCP follow-up      Consultations During Hospital Stay:  [facility] TO CASE MANAGEMENT  IP CONSULT TO NUTRITION SERVICES    Procedures Performed:     · none    Significant Findings:     · Refer to hospital course and above listed diagnosis related plan for details    Imaging while in hospital:    CTA head and neck with and without contrast    Result Date: 4/5/2022  Narrative: CTA NECK AND BRAIN WITH AND WITHOUT CONTRAST INDICATION: vertigo facial droop COMPARISON:   None  TECHNIQUE:  Routine CT imaging of the Brain without contrast   Post contrast imaging was performed after administration of iodinated contrast through the neck and brain  Post contrast axial 0 625 mm images timed to opacify the arterial system  3D rendering was performed on an independent workstation  MIP reconstructions performed  Coronal reconstructions were performed of the noncontrast portion of the brain  Radiation dose length product (DLP) for this visit:  1303 mGy-cm   This examination, like all CT scans performed in the [facility], was performed utilizing techniques to minimize radiation dose exposure, including the use of iterative reconstruction and automated exposure control     IV Contrast:  85 mL of iohexol (OMNIPAQUE)  IMAGE QUALITY:   Diagnostic FINDINGS: NONCONTRAST BRAIN PARENCHYMA: Nonspecific  decreased attenuation involving periventricular and subcortical white matter, most compatible with moderate microangiopathic change  No intracranial mass, mass effect or midline shift  No CT signs of acute infarction  No acute parenchymal hemorrhage  VENTRICLES AND EXTRA-AXIAL SPACES:  Normal for the patient's age  VISUALIZED ORBITS AND PARANASAL SINUSES:  Orbits are unremarkable  Right sphenoid sinus mucosal thickening with air bubbles  CERVICAL VASCULATURE AORTIC ARCH AND GREAT VESSELS: Anatomic variant bovine arch configuration  Mild atherosclerotic calcification of aortic arch  Great vessel origins are patent without significant stenosis  RIGHT VERTEBRAL ARTERY CERVICAL SEGMENT:The vessel origin is unremarkable  The vessel is patent throughout the neck without high-grade stenosis  LEFT VERTEBRAL ARTERY CERVICAL SEGMENT: The vessel origin is unremarkable  The vessel is patent throughout the neck without high-grade stenosis  RIGHT EXTRACRANIAL CAROTID SEGMENT:The carotid bifurcation and cervical internal carotid artery are unremarkable  No stenosis or dissection  LEFT EXTRACRANIAL CAROTID SEGMENT: The carotid bifurcation and cervical internal carotid artery are unremarkable  No stenosis or dissection  INTRACRANIAL VASCULATURE INTERNAL CAROTID ARTERIES: The intracranial portions of the internal carotid arteries are unremarkable  Normal ophthalmic artery origins  ANTERIOR CIRCULATION:  Normal A1 segments  Bilateral anterior cerebral arteries are unremarkable  Normal anterior comminuting artery  MIDDLE CEREBRAL ARTERY CIRCULATION:  Bilateral M1 segments and major M2 branches are patent without high-grade stenosis  DISTAL VERTEBRAL ARTERIES:  Distal vertebral arteries are patent without high-grade stenosis  Posterior inferior cerebellar artery origins are patent  BASILAR ARTERY:  Basilar artery is unremarkable  There is proximal basilar artery/vertebrobasilar junction fenestration  Normal superior cerebellar arteries   POSTERIOR CEREBRAL ARTERIES:    Bilateral posterior cerebral arteries are patent without high-grade stenosis  Absent/hypoplastic posterior communicating arteries  DURAL VENOUS SINUSES:  Unremarkable  NON VASCULAR ANATOMY BONY STRUCTURES: Degenerative changes of cervical spine  No acute or aggressive appearing osseous abnormality  Periapical lucency of endodontic treated right maxillary molar tooth  SOFT TISSUES OF THE NECK: Thyroid nodules, the largest on the left measures 1 cm  According to guidelines published in the February 2015 white paper on incidental thyroid nodules in the Journal of the [employer] of Radiology [facility]), because the nodule is less than 1 5 cm in size and without suspicious feature, no further evaluation is recommended  THORACIC INLET:  Unremarkable  Impression: 1  No acute intracranial CT abnormality  2   Nonspecific white matter change suggesting microangiopathy  3   Patent major vasculature of [place] without high-grade stenosis  No aneurysm  4   Unremarkable CT angiogram of the neck  5   Right sphenoid sinus mucosal thickening with air bubbles suggesting acute process  6   Periapical lucency of endodontic treated right maxillary molar tooth  Workstation performed: [id number]     XR chest 1 view portable    Result Date: 4/5/2022  Narrative: CHEST INDICATION:   cva  COMPARISON:  None EXAM PERFORMED/VIEWS:  XR CHEST PORTABLE FINDINGS: Cardiomediastinal silhouette appears unremarkable  The lungs are clear  No pneumothorax or pleural effusion  Osseous structures appear within normal limits for patient age  Impression: No acute cardiopulmonary disease  Workstation performed: [id number]     MRI brain wo contrast    Result Date: 4/6/2022  Narrative: MRI BRAIN WITHOUT CONTRAST INDICATION: possible CVA, dizziness, right facial droop  COMPARISON:   4/5/2022 TECHNIQUE:  Sagittal T1, axial T2, axial FLAIR, axial T1, axial [place] and axial diffusion imaging  IMAGE QUALITY:  Motion artifact limits portions of the study    Some diagnostic information is obtained  FINDINGS: BRAIN PARENCHYMA:  There is no discrete mass, mass effect or midline shift  There is no intracranial hemorrhage  There is no evidence of acute infarction and diffusion imaging is unremarkable  Small scattered hyperintensities on T2/FLAIR imaging are noted in the periventricular and subcortical white matter demonstrating an appearance that is statistically most likely to represent moderate microangiopathic change  VENTRICLES:  Normal for the patient's age  SELLA AND PITUITARY GLAND:  Normal  ORBITS:  Bilateral lens implants noted  PARANASAL SINUSES:  Normal  VASCULATURE:  Evaluation of the major intracranial vasculature demonstrates appropriate flow voids  CALVARIUM AND SKULL BASE:  Normal  EXTRACRANIAL SOFT TISSUES:  Normal      Impression: 1  No acute infarction, intracranial hemorrhage or mass effect  2   Moderate, chronic microangiopathy  Workstation performed: [id number]       Incidental Findings:   · none     Test Results Pending at Discharge (will require follow up):   · As per After Visit Summary     Outpatient Tests Requested:  · no    Complications:  Refer to hospital course and above listed diagnosis related plan, if any    Hospital Course:     Debra Knox is a 70 y o  male patient who originally presented to the hospital on 4/5/2022 due to vertigo and concern for CVA by his PCP due to a slight right-sided facial droop  CVA workup which included a head CT and brain MRI were negative  Vertigo was treated with meclizine and patient experienced no episodes during hospitalization  Please see above list of diagnoses and related plan for additional information  Condition at Discharge: good     Discharge Day Visit / Exam:     Subjective:  Patient was asymptomatic on exam today  He denies any episodes of vertigo since his hospitalization  He denies any dizziness, lightheadedness, headache, numbness or weakness      Vitals: Blood Pressure: 126/69 (04/06/22 0600)  Pulse: 67 (04/06/22 0530)  Temperature: 98 °F (36 7 °C) (04/06/22 0600)  Temp Source: Oral (04/06/22 0600)  Respirations: 18 (04/06/22 0600)  Height: 5' 9" (175 3 cm) (04/05/22 1201)  Weight - Scale: 81 2 kg (179 lb) (04/05/22 1201)  SpO2: 97 % (04/06/22 0530)  Exam:   Physical Exam  Constitutional:       General: He is not in acute distress  HENT:      Head: Normocephalic  Mouth/Throat:      Mouth: Mucous membranes are moist    Eyes:      Extraocular Movements: Extraocular movements intact  Pupils: Pupils are equal, round, and reactive to light  Cardiovascular:      Rate and Rhythm: Normal rate and regular rhythm  Pulses: Normal pulses  Heart sounds: Normal heart sounds  Pulmonary:      Effort: Pulmonary effort is normal       Breath sounds: Normal breath sounds  Abdominal:      General: Bowel sounds are normal       Tenderness: There is no abdominal tenderness  Skin:     Capillary Refill: Capillary refill takes less than 2 seconds  Neurological:      General: No focal deficit present  Mental Status: He is alert and oriented to person, place, and time  Gait: Gait normal       Comments: Cranial nerves grossly intact with exception of slight flattening of right nasolabial fold   Psychiatric:         Mood and Affect: Mood normal          Behavior: Behavior normal          Discharge instructions/Information to patient and family:(Discharge Medications and Follow up):   See after visit summary for information provided to patient and family  Provisions for Follow-Up Care:  See after visit summary for information related to follow-up care and any pertinent home health orders  Disposition: Home    Planned Readmission:  No     Discharge Statement:  I spent 45 minutes discharging the patient  This time was spent on the day of discharge  I had direct contact with the patient on the day of discharge   Greater than 50% of the total time was spent examining patient, answering all patient questions, arranging and discussing plan of care with patient as well as directly providing post-discharge instructions  Additional time then spent on discharge activities  Discharge Medications:  See after visit summary for reconciled discharge medications provided to patient and family  ** Please Note: "This note has been constructed using a voice recognition system  Therefore there may be syntax, spelling, and/or grammatical errors   Please call if you have any questions  "**

## 2022-04-06 NOTE — ASSESSMENT & PLAN NOTE
Lipid panel 4/5: Chol 202, HDL 47, LDL calc 142, Tri 64    - started on Lipitor 20 mg to be continued outpatient  - PCP follow-up

## 2022-04-06 NOTE — OCCUPATIONAL THERAPY NOTE
OT EVALUATION       04/06/22 1250   Note Type   Note type Screen   Additional Comments pt is independent with ADLS per PT eval   No skilled OT needs at this time      Licensure   [place] License Number  Marlin Andrade Brenden 87 OTR/L [id number]

## 2022-04-07 ENCOUNTER — TELEPHONE (OUTPATIENT)
Dept: NEUROLOGY | Facility: CLINIC | Age: 72
End: 2022-04-07

## 2022-04-07 NOTE — TELEPHONE ENCOUNTER
1st Attempt LVM for pt to inform that I sched HFU appt with Nara Farnsworth on 8/12/22 at 1:00 pm  Pt added to the [facility]  Appt Letter mailed  HFU/SLWA/ATT/AP/Facial Droop/DC4/6    Note from Chart:        Kateryna Perkins will need follow up in 8-10 weeks with general attending or advance practitioner  He will not require outpatient neurological testing

## 2022-04-08 NOTE — TELEPHONE ENCOUNTER
2nd Attempt LVM for pt and pt's Wife to inform that I sched HFU appt with Aishavibha Last on 8/12/22 at 1:00 pm

## 2022-04-11 ENCOUNTER — TELEPHONE (OUTPATIENT)
Dept: GASTROENTEROLOGY | Facility: CLINIC | Age: 72
End: 2022-04-11

## 2022-04-11 NOTE — TELEPHONE ENCOUNTER
Patient had left message asking for a call  Didn't state what it was regarding  I returned his call and he answered but was driving  He apologized and said he would call back once he was home

## 2022-04-11 NOTE — TELEPHONE ENCOUNTER
3rd Attempt LVM for pt and pt's Wife to inform that I sched HFU appt with Joe Leader 8/12/22 at 1:00 pm

## 2022-04-14 ENCOUNTER — OFFICE VISIT (OUTPATIENT)
Dept: GASTROENTEROLOGY | Facility: CLINIC | Age: 72
End: 2022-04-14
Payer: MEDICARE

## 2022-04-14 VITALS
HEART RATE: 105 BPM | BODY MASS INDEX: 26.51 KG/M2 | HEIGHT: 69 IN | DIASTOLIC BLOOD PRESSURE: 84 MMHG | SYSTOLIC BLOOD PRESSURE: 152 MMHG | WEIGHT: 179 LBS

## 2022-04-14 DIAGNOSIS — Z12.11 COLON CANCER SCREENING: ICD-10-CM

## 2022-04-14 DIAGNOSIS — K44.9 HIATAL HERNIA WITH GERD: ICD-10-CM

## 2022-04-14 DIAGNOSIS — K21.9 HIATAL HERNIA WITH GERD: ICD-10-CM

## 2022-04-14 DIAGNOSIS — K21.9 GASTROESOPHAGEAL REFLUX DISEASE, UNSPECIFIED WHETHER ESOPHAGITIS PRESENT: Primary | ICD-10-CM

## 2022-04-14 PROCEDURE — 99204 OFFICE O/P NEW MOD 45 MIN: CPT | Performed by: INTERNAL MEDICINE

## 2022-04-14 NOTE — PATIENT INSTRUCTIONS
Scheduled date of EGD/colonoscopy (as of today): 5/25/22  Physician performing EGD/colonoscopy: Fareed Rolle  Location of EGD/colonoscopy: [facility]  Desired bowel prep reviewed with patient: Miralax/Dulcolax  Instructions reviewed with patient by: Bouchra  Clearances:  N/A

## 2022-04-14 NOTE — PROGRESS NOTES
[facility] - Outpatient Consultation  Sheela Barry 70 y o  male MRN: [id number]  Encounter: [id number]          ASSESSMENT AND PLAN:      1  Gastroesophageal reflux disease, unspecified whether esophagitis present  Patient longstanding history of gastroesophageal reflux disease with heartburn indigestion  He has taken PPI  Denies any dysphagia odynophagia  Denies any nausea or vomiting  Upper endoscopy will be done for evaluation  2  Hiatal hernia with GERD  He had a barium swallow done which had revealed hiatal hernia and reflux  Further evaluation will be indicated  Patient continues to be symptomatic  There is no nausea or vomiting  EGD will be scheduled  3  Colon cancer screening  Patient had a colonoscopy more than 10 years ago at [place]  A screening colonoscopy will also be scheduled  He denies any rectal bleeding or mucus per rectum  Denies any abdominal pain or discomfort  There is no nausea or vomiting  He denies any chest pain, cough or wheeze  There is no palpitation, orthopnea or exertional dyspnea     ______________________________________________________________________    HPI:  Patient is overall asymptomatic other than having heartburn indigestion  There is no change in bowel habits  There is no rectal bleeding or mucus per rectum  REVIEW OF SYSTEMS:    CONSTITUTIONAL: Denies any fever, chills, rigors, and weight loss  HEENT: No earache or tinnitus  Denies hearing loss or visual disturbances  CARDIOVASCULAR: No chest pain or palpitations  RESPIRATORY: Denies any cough, hemoptysis, shortness of breath or dyspnea on exertion  GASTROINTESTINAL: As noted in the History of Present Illness  GENITOURINARY: No problems with urination  Denies any hematuria or dysuria  NEUROLOGIC: No dizziness or vertigo, denies headaches  MUSCULOSKELETAL: Denies any muscle or joint pain  SKIN: Denies skin rashes or itching     ENDOCRINE: Denies excessive thirst  Denies intolerance to heat or cold  PSYCHOSOCIAL: Denies depression or anxiety  Denies any recent memory loss  Historical Information   Past Medical History:   Diagnosis Date    Stroke-like symptom 4/5/2022     History reviewed  No pertinent surgical history  Social History   Social History     Substance and Sexual Activity   Alcohol Use Not Currently     Social History     Substance and Sexual Activity   Drug Use Never     Social History     Tobacco Use   Smoking Status Never Smoker   Smokeless Tobacco Never Used     History reviewed  No pertinent family history  Meds/Allergies       Current Outpatient Medications:     atorvastatin (LIPITOR) 20 mg tablet    esomeprazole (NexIUM) 20 mg capsule    fluticasone (FLONASE) 50 mcg/act nasal spray    meclizine (ANTIVERT) 25 mg tablet    olopatadine (PATANOL) 0 1 % ophthalmic solution    tadalafil (CIALIS) 20 MG tablet    triamcinolone (KENALOG) 0 5 % cream    No Known Allergies        Objective     Blood pressure 152/84, pulse 105, height 5' 9" (1 753 m), weight 81 2 kg (179 lb)  Body mass index is 26 43 kg/m²  PHYSICAL EXAM:      General Appearance:   Alert, cooperative, no distress   HEENT:   Normocephalic, atraumatic, anicteric      Neck:  Supple, symmetrical, trachea midline   Lungs:   Clear to auscultation bilaterally; no rales, rhonchi or wheezing; respirations unlabored    Heart[de-identified]   Regular rate and rhythm; no murmur, rub, or gallop  Abdomen:   Soft, non-tender, non-distended; normal bowel sounds; no masses, no organomegaly    Genitalia:   Deferred    Rectal:   Deferred    Extremities:  No cyanosis, clubbing or edema    Pulses:  2+ and symmetric    Skin:  No jaundice, rashes, or lesions    Lymph nodes:  No palpable cervical lymphadenopathy        Lab Results:   No visits with results within 1 Day(s) from this visit     Latest known visit with results is:   Admission on 04/05/2022, Discharged on 04/06/2022   Component Date Value    WBC 04/05/2022 5 01     RBC 04/05/2022 5 63*    Hemoglobin 04/05/2022 15 7     Hematocrit 04/05/2022 48 2     MCV 04/05/2022 86     MCH 04/05/2022 27 9     MCHC 04/05/2022 32 6     RDW 04/05/2022 13 5     MPV 04/05/2022 10 5     Platelets 52/32/6716 211     nRBC 04/05/2022 0     Neutrophils Relative 04/05/2022 62     Immat GRANS % 04/05/2022 0     Lymphocytes Relative 04/05/2022 27     Monocytes Relative 04/05/2022 9     Eosinophils Relative 04/05/2022 1     Basophils Relative 04/05/2022 1     Neutrophils Absolute 04/05/2022 3 08     Immature Grans Absolute 04/05/2022 0 01     Lymphocytes Absolute 04/05/2022 1 37     Monocytes Absolute 04/05/2022 0 45     Eosinophils Absolute 04/05/2022 0 05     Basophils Absolute 04/05/2022 0 05     Sodium 04/05/2022 142     Potassium 04/05/2022 3 5     Chloride 04/05/2022 104     CO2 04/05/2022 28     ANION GAP 04/05/2022 10     BUN 04/05/2022 13     Creatinine 04/05/2022 0 95     Glucose 04/05/2022 132     Calcium 04/05/2022 8 7     AST 04/05/2022 17     ALT 04/05/2022 33     Alkaline Phosphatase 04/05/2022 77     Total Protein 04/05/2022 7 9     Albumin 04/05/2022 3 6     Total Bilirubin 04/05/2022 0 43     eGFR 04/05/2022 80     Protime 04/05/2022 13 2     INR 04/05/2022 1 02     PTT 04/05/2022 27     SARS-CoV-2 04/05/2022 Negative     INFLUENZA A PCR 04/05/2022 Negative     INFLUENZA B PCR 04/05/2022 Negative     RSV PCR 04/05/2022 Negative     Color, UA 04/05/2022 Light Yellow     Clarity, UA 04/05/2022 Clear     Specific Gravity, UA 04/05/2022 1 010     pH, UA 04/05/2022 8 0     Leukocytes, UA 04/05/2022 Negative     Nitrite, UA 04/05/2022 Negative     Protein, UA 04/05/2022 Negative     Glucose, UA 04/05/2022 Negative     Ketones, UA 04/05/2022 Negative     Urobilinogen, UA 04/05/2022 0 2     Bilirubin, UA 04/05/2022 Negative     Blood, UA 04/05/2022 Negative     hs TnI 0hr 04/05/2022 5     Ventricular Rate 04/05/2022 91     Atrial Rate 04/05/2022 91     CO Interval 04/05/2022 144     QRSD Interval 04/05/2022 74     QT Interval 04/05/2022 330     QTC Interval 04/05/2022 405     P Axis 04/05/2022 49     QRS Axis 04/05/2022 12     T Wave Axis 04/05/2022 48     hs TnI 2hr 04/05/2022 9     Delta 2hr hsTnI 04/05/2022 4     Hemoglobin A1C 04/05/2022 7 4*    EAG 04/05/2022 166     Cholesterol 04/06/2022 202*    Triglycerides 04/06/2022 64     HDL, Direct 04/06/2022 47     LDL Calculated 04/06/2022 142*    Sodium 04/06/2022 140     Potassium 04/06/2022 3 6     Chloride 04/06/2022 105     CO2 04/06/2022 26     ANION GAP 04/06/2022 9     BUN 04/06/2022 14     Creatinine 04/06/2022 1 05     Glucose 04/06/2022 137     Glucose, Fasting 04/06/2022 137*    Calcium 04/06/2022 8 6     eGFR 04/06/2022 71          Radiology Results:   CTA head and neck with and without contrast    Result Date: 4/5/2022  Narrative: CTA NECK AND BRAIN WITH AND WITHOUT CONTRAST INDICATION: vertigo facial droop COMPARISON:   None  TECHNIQUE:  Routine CT imaging of the Brain without contrast   Post contrast imaging was performed after administration of iodinated contrast through the neck and brain  Post contrast axial 0 625 mm images timed to opacify the arterial system  3D rendering was performed on an independent workstation  MIP reconstructions performed  Coronal reconstructions were performed of the noncontrast portion of the brain  Radiation dose length product (DLP) for this visit:  1303 mGy-cm   This examination, like all CT scans performed in the [facility], was performed utilizing techniques to minimize radiation dose exposure, including the use of iterative reconstruction and automated exposure control     IV Contrast:  85 mL of iohexol (OMNIPAQUE)  IMAGE QUALITY:   Diagnostic FINDINGS: NONCONTRAST BRAIN PARENCHYMA: Nonspecific  decreased attenuation involving periventricular and subcortical white matter, most compatible with moderate microangiopathic change  No intracranial mass, mass effect or midline shift  No CT signs of acute infarction  No acute parenchymal hemorrhage  VENTRICLES AND EXTRA-AXIAL SPACES:  Normal for the patient's age  VISUALIZED ORBITS AND PARANASAL SINUSES:  Orbits are unremarkable  Right sphenoid sinus mucosal thickening with air bubbles  CERVICAL VASCULATURE AORTIC ARCH AND GREAT VESSELS: Anatomic variant bovine arch configuration  Mild atherosclerotic calcification of aortic arch  Great vessel origins are patent without significant stenosis  RIGHT VERTEBRAL ARTERY CERVICAL SEGMENT:The vessel origin is unremarkable  The vessel is patent throughout the neck without high-grade stenosis  LEFT VERTEBRAL ARTERY CERVICAL SEGMENT: The vessel origin is unremarkable  The vessel is patent throughout the neck without high-grade stenosis  RIGHT EXTRACRANIAL CAROTID SEGMENT:The carotid bifurcation and cervical internal carotid artery are unremarkable  No stenosis or dissection  LEFT EXTRACRANIAL CAROTID SEGMENT: The carotid bifurcation and cervical internal carotid artery are unremarkable  No stenosis or dissection  INTRACRANIAL VASCULATURE INTERNAL CAROTID ARTERIES: The intracranial portions of the internal carotid arteries are unremarkable  Normal ophthalmic artery origins  ANTERIOR CIRCULATION:  Normal A1 segments  Bilateral anterior cerebral arteries are unremarkable  Normal anterior comminuting artery  MIDDLE CEREBRAL ARTERY CIRCULATION:  Bilateral M1 segments and major M2 branches are patent without high-grade stenosis  DISTAL VERTEBRAL ARTERIES:  Distal vertebral arteries are patent without high-grade stenosis  Posterior inferior cerebellar artery origins are patent  BASILAR ARTERY:  Basilar artery is unremarkable  There is proximal basilar artery/vertebrobasilar junction fenestration  Normal superior cerebellar arteries   POSTERIOR CEREBRAL ARTERIES:    Bilateral posterior cerebral arteries are patent without high-grade stenosis  Absent/hypoplastic posterior communicating arteries  DURAL VENOUS SINUSES:  Unremarkable  NON VASCULAR ANATOMY BONY STRUCTURES: Degenerative changes of cervical spine  No acute or aggressive appearing osseous abnormality  Periapical lucency of endodontic treated right maxillary molar tooth  SOFT TISSUES OF THE NECK: Thyroid nodules, the largest on the left measures 1 cm  According to guidelines published in the February 2015 white paper on incidental thyroid nodules in the Journal of the [employer] of Radiology Melissa Marcano), because the nodule is less than 1 5 cm in size and without suspicious feature, no further evaluation is recommended  THORACIC INLET:  Unremarkable  Impression: 1  No acute intracranial CT abnormality  2   Nonspecific white matter change suggesting microangiopathy  3   Patent major vasculature of [place] without high-grade stenosis  No aneurysm  4   Unremarkable CT angiogram of the neck  5   Right sphenoid sinus mucosal thickening with air bubbles suggesting acute process  6   Periapical lucency of endodontic treated right maxillary molar tooth  Workstation performed: [id number]     XR chest 1 view portable    Result Date: 4/5/2022  Narrative: CHEST INDICATION:   cva  COMPARISON:  None EXAM PERFORMED/VIEWS:  XR CHEST PORTABLE FINDINGS: Cardiomediastinal silhouette appears unremarkable  The lungs are clear  No pneumothorax or pleural effusion  Osseous structures appear within normal limits for patient age  Impression: No acute cardiopulmonary disease  Workstation performed: [id number]     MRI brain wo contrast    Result Date: 4/6/2022  Narrative: MRI BRAIN WITHOUT CONTRAST INDICATION: possible CVA, dizziness, right facial droop  COMPARISON:   4/5/2022 TECHNIQUE:  Sagittal T1, axial T2, axial FLAIR, axial T1, axial [place] and axial diffusion imaging   IMAGE QUALITY:  Motion artifact limits portions of the study   Some diagnostic information is obtained  FINDINGS: BRAIN PARENCHYMA:  There is no discrete mass, mass effect or midline shift  There is no intracranial hemorrhage  There is no evidence of acute infarction and diffusion imaging is unremarkable  Small scattered hyperintensities on T2/FLAIR imaging are noted in the periventricular and subcortical white matter demonstrating an appearance that is statistically most likely to represent moderate microangiopathic change  VENTRICLES:  Normal for the patient's age  SELLA AND PITUITARY GLAND:  Normal  ORBITS:  Bilateral lens implants noted  PARANASAL SINUSES:  Normal  VASCULATURE:  Evaluation of the major intracranial vasculature demonstrates appropriate flow voids  CALVARIUM AND SKULL BASE:  Normal  EXTRACRANIAL SOFT TISSUES:  Normal      Impression: 1  No acute infarction, intracranial hemorrhage or mass effect  2   Moderate, chronic microangiopathy   Workstation performed: [id number]

## 2022-05-18 ENCOUNTER — TELEPHONE (OUTPATIENT)
Dept: GASTROENTEROLOGY | Facility: CLINIC | Age: 72
End: 2022-05-18

## 2022-05-18 DIAGNOSIS — Z20.822 COVID-19 RULED OUT BY LABORATORY TESTING: Primary | ICD-10-CM

## 2022-05-18 NOTE — TELEPHONE ENCOUNTER
I lmom confirming pt's colonoscopy scheduled on 5/25/22 with Dr Ford Dubin at [place] [address]  I informed pt that he would be called the day prior with his arrival time  I informed pt that he will need to do a clear liquid diet day prior and will also need to do the bowel prep  Pt also informed that he will need a  the day of the procedure due to being under sedation  I asked pt to please call back if he has not received his instructions or if he has any questions

## 2022-05-24 RX ORDER — SODIUM CHLORIDE, SODIUM LACTATE, POTASSIUM CHLORIDE, CALCIUM CHLORIDE 600; 310; 30; 20 MG/100ML; MG/100ML; MG/100ML; MG/100ML
75 INJECTION, SOLUTION INTRAVENOUS CONTINUOUS
Status: CANCELLED | OUTPATIENT
Start: 2022-05-24

## 2022-05-25 ENCOUNTER — ANESTHESIA EVENT (OUTPATIENT)
Dept: GASTROENTEROLOGY | Facility: AMBULARY SURGERY CENTER | Age: 72
End: 2022-05-25

## 2022-05-25 ENCOUNTER — HOSPITAL ENCOUNTER (OUTPATIENT)
Dept: GASTROENTEROLOGY | Facility: AMBULARY SURGERY CENTER | Age: 72
Setting detail: OUTPATIENT SURGERY
Discharge: HOME | End: 2022-05-25
Attending: INTERNAL MEDICINE
Payer: MEDICARE

## 2022-05-25 ENCOUNTER — ANESTHESIA (OUTPATIENT)
Dept: GASTROENTEROLOGY | Facility: AMBULARY SURGERY CENTER | Age: 72
End: 2022-05-25

## 2022-05-25 VITALS
OXYGEN SATURATION: 99 % | DIASTOLIC BLOOD PRESSURE: 76 MMHG | SYSTOLIC BLOOD PRESSURE: 138 MMHG | RESPIRATION RATE: 18 BRPM | TEMPERATURE: 97.2 F | HEART RATE: 80 BPM

## 2022-05-25 DIAGNOSIS — K21.9 HIATAL HERNIA WITH GERD: ICD-10-CM

## 2022-05-25 DIAGNOSIS — K44.9 HIATAL HERNIA WITH GERD: ICD-10-CM

## 2022-05-25 DIAGNOSIS — Z12.11 COLON CANCER SCREENING: ICD-10-CM

## 2022-05-25 DIAGNOSIS — K21.9 GASTROESOPHAGEAL REFLUX DISEASE, UNSPECIFIED WHETHER ESOPHAGITIS PRESENT: ICD-10-CM

## 2022-05-25 PROBLEM — Z86.010 PERSONAL HISTORY OF COLONIC POLYPS: Status: ACTIVE | Noted: 2022-05-25

## 2022-05-25 PROBLEM — Z86.0100 PERSONAL HISTORY OF COLONIC POLYPS: Status: ACTIVE | Noted: 2022-05-25

## 2022-05-25 PROCEDURE — 45385 COLONOSCOPY W/LESION REMOVAL: CPT | Performed by: INTERNAL MEDICINE

## 2022-05-25 PROCEDURE — 45380 COLONOSCOPY AND BIOPSY: CPT | Performed by: INTERNAL MEDICINE

## 2022-05-25 PROCEDURE — 88305 TISSUE EXAM BY PATHOLOGIST: CPT | Performed by: PATHOLOGY

## 2022-05-25 PROCEDURE — 43239 EGD BIOPSY SINGLE/MULTIPLE: CPT | Performed by: INTERNAL MEDICINE

## 2022-05-25 RX ORDER — SODIUM CHLORIDE, SODIUM LACTATE, POTASSIUM CHLORIDE, CALCIUM CHLORIDE 600; 310; 30; 20 MG/100ML; MG/100ML; MG/100ML; MG/100ML
INJECTION, SOLUTION INTRAVENOUS CONTINUOUS PRN
Status: DISCONTINUED | OUTPATIENT
Start: 2022-05-25 | End: 2022-05-25

## 2022-05-25 RX ORDER — SODIUM CHLORIDE, SODIUM LACTATE, POTASSIUM CHLORIDE, CALCIUM CHLORIDE 600; 310; 30; 20 MG/100ML; MG/100ML; MG/100ML; MG/100ML
75 INJECTION, SOLUTION INTRAVENOUS CONTINUOUS
Status: DISCONTINUED | OUTPATIENT
Start: 2022-05-25 | End: 2022-05-29 | Stop reason: HOSPADM

## 2022-05-25 RX ADMIN — SODIUM CHLORIDE, SODIUM LACTATE, POTASSIUM CHLORIDE, AND CALCIUM CHLORIDE: .6; .31; .03; .02 INJECTION, SOLUTION INTRAVENOUS at 10:12

## 2022-05-25 RX ADMIN — SODIUM CHLORIDE, SODIUM LACTATE, POTASSIUM CHLORIDE, AND CALCIUM CHLORIDE 75 ML/HR: .6; .31; .03; .02 INJECTION, SOLUTION INTRAVENOUS at 11:20

## 2022-05-25 NOTE — ANESTHESIA POSTPROCEDURE EVALUATION
Post-Op Assessment Note    CV Status:  Stable  Pain Score: 0       Mental Status:  Awake   Hydration Status:  Stable   PONV Controlled:  None   Airway Patency:  Patent      Post Op Vitals Reviewed: Yes      Staff: Anesthesiologist         No complications documented      BP      Temp     Pulse     Resp      SpO2

## 2022-05-25 NOTE — H&P
History and Physical - [facility]  April List 70 y o  male MRN: [id number]                  HPI: April Hooks is a 70y o  year old male who presents for gastroesophageal reflux disease and screening colonoscopy  REVIEW OF SYSTEMS: Per the HPI, and otherwise unremarkable  Historical Information   Past Medical History:   Diagnosis Date    GERD (gastroesophageal reflux disease)     Hiatal hernia     Presence of upper and lower permanent dental bridges     upper/lower    Stroke-like symptom 04/05/2022    Vertigo     Wears glasses     for reading     Past Surgical History:   Procedure Laterality Date    COLONOSCOPY       Social History   Social History     Substance and Sexual Activity   Alcohol Use Not Currently     Social History     Substance and Sexual Activity   Drug Use Never     Social History     Tobacco Use   Smoking Status Never Smoker   Smokeless Tobacco Never Used     History reviewed  No pertinent family history      Meds/Allergies       Current Outpatient Medications:     atorvastatin (LIPITOR) 20 mg tablet    bisacodyl (DULCOLAX) 5 mg EC tablet    esomeprazole (NexIUM) 20 mg capsule    fluticasone (FLONASE) 50 mcg/act nasal spray    olopatadine (PATANOL) 0 1 % ophthalmic solution    Polyethylene Glycol 3350 (MIRALAX PO)    meclizine (ANTIVERT) 25 mg tablet    tadalafil (CIALIS) 20 MG tablet    Current Facility-Administered Medications:     lactated ringers infusion, 75 mL/hr, Intravenous, Continuous    Facility-Administered Medications Ordered in Other Encounters:     lactated ringers infusion, , Intravenous, Continuous PRN, New Bag at 05/25/22 1012    No Known Allergies    Objective     /78   Pulse 97   Temp (!) 97 2 °F (36 2 °C) (Temporal)   Resp 16   SpO2 97%       PHYSICAL EXAM    Gen: NAD  Head: NCAT  CV: RRR  CHEST: Clear  ABD: soft, NT/ND  EXT: no edema      ASSESSMENT/PLAN:  This is a 70y o  year old male here for EGD and colonoscopy, and he is stable and optimized for his procedure

## 2022-05-25 NOTE — INTERVAL H&P NOTE
H&P reviewed  After examining the patient I find no changes in the patients condition since the H&P had been written      Vitals:    05/25/22 1058   BP: 162/78   Pulse: 97   Resp: 16   Temp: (!) 97 2 °F (36 2 °C)   SpO2: 97%

## 2022-05-25 NOTE — DISCHARGE SUMMARY
Discharge Summary - Naomy Age 70 y o  male MRN: [id number]    Unit/Bed#:  Encounter: [id number]    Admission Date:  05/25/2022    Admitting Diagnosis: Colon cancer screening [Z12 11]  Gastroesophageal reflux disease, unspecified whether esophagitis present [K21 9]  Hiatal hernia with GERD [K44 9, K21 9]    HPI:  History of reflux and hiatal hernia  Screening colonoscopy  Procedures Performed: No orders of the defined types were placed in this encounter  Summary of Hospital Course:  Hiatal hernia and reflux noted  Colon polyp removed  Colonoscopy was suboptimal because of poor prep  Significant Findings, Care, Treatment and Services Provided:  See above    Complications:  None    Discharge Diagnosis:  See above    Medical Problems             Resolved Problems  Date Reviewed: 4/5/2022   None                 Condition at Discharge: good         Discharge instructions/Information to patient and family:   See after visit summary for information provided to patient and family  Provisions for Follow-Up Care:  See after visit summary for information related to follow-up care and any pertinent home health orders        PCP: Jacobo Jackson DO    Disposition: Home

## 2022-05-25 NOTE — ANESTHESIA PREPROCEDURE EVALUATION
Procedure:  COLONOSCOPY  EGD    Relevant Problems   CARDIO   (+) Hyperlipidemia      GI/HEPATIC   (+) GERD (gastroesophageal reflux disease)   (+) Hiatal hernia      MUSCULOSKELETAL   (+) Primary osteoarthritis of right shoulder        Physical Exam    Airway    Mallampati score: II  TM Distance: >3 FB  Neck ROM: full     Dental       Cardiovascular  Rhythm: regular, Rate: normal,     Pulmonary  Breath sounds clear to auscultation,     Other Findings  Upper and lower permeant bridge      Anesthesia Plan  ASA Score- 2     Anesthesia Type- IV sedation with anesthesia with ASA Monitors  Additional Monitors:   Airway Plan:           Plan Factors-    Chart reviewed  Patient is not a current smoker  Induction- intravenous  Postoperative Plan-     Informed Consent- Anesthetic plan and risks discussed with patient  I personally reviewed this patient with the CRNA  Discussed and agreed on the Anesthesia Plan with the CRNA  Peggy Console

## 2022-06-20 ENCOUNTER — TELEPHONE (OUTPATIENT)
Dept: FAMILY MEDICINE CLINIC | Facility: CLINIC | Age: 72
End: 2022-06-20

## 2022-06-21 ENCOUNTER — OFFICE VISIT (OUTPATIENT)
Dept: FAMILY MEDICINE CLINIC | Facility: CLINIC | Age: 72
End: 2022-06-21
Payer: MEDICARE

## 2022-06-21 VITALS
TEMPERATURE: 97.3 F | HEIGHT: 69 IN | SYSTOLIC BLOOD PRESSURE: 130 MMHG | RESPIRATION RATE: 18 BRPM | OXYGEN SATURATION: 98 % | WEIGHT: 177.4 LBS | DIASTOLIC BLOOD PRESSURE: 68 MMHG | BODY MASS INDEX: 26.28 KG/M2 | HEART RATE: 83 BPM

## 2022-06-21 DIAGNOSIS — R73.09 ELEVATED GLUCOSE: ICD-10-CM

## 2022-06-21 DIAGNOSIS — J30.2 SEASONAL ALLERGIC RHINITIS, UNSPECIFIED TRIGGER: ICD-10-CM

## 2022-06-21 DIAGNOSIS — N52.9 ERECTILE DYSFUNCTION, UNSPECIFIED ERECTILE DYSFUNCTION TYPE: ICD-10-CM

## 2022-06-21 DIAGNOSIS — Z00.00 MEDICARE ANNUAL WELLNESS VISIT, SUBSEQUENT: Primary | ICD-10-CM

## 2022-06-21 DIAGNOSIS — Z12.5 PROSTATE CANCER SCREENING: ICD-10-CM

## 2022-06-21 DIAGNOSIS — K21.9 GASTROESOPHAGEAL REFLUX DISEASE, UNSPECIFIED WHETHER ESOPHAGITIS PRESENT: ICD-10-CM

## 2022-06-21 DIAGNOSIS — H10.13 ALLERGIC CONJUNCTIVITIS OF BOTH EYES: ICD-10-CM

## 2022-06-21 PROCEDURE — 99214 OFFICE O/P EST MOD 30 MIN: CPT | Performed by: FAMILY MEDICINE

## 2022-06-21 PROCEDURE — G0439 PPPS, SUBSEQ VISIT: HCPCS | Performed by: FAMILY MEDICINE

## 2022-06-21 RX ORDER — SILDENAFIL 100 MG/1
100 TABLET, FILM COATED ORAL DAILY PRN
Qty: 30 TABLET | Refills: 5 | Status: SHIPPED | OUTPATIENT
Start: 2022-06-21

## 2022-06-21 NOTE — PATIENT INSTRUCTIONS
Your sugar is in diabetes range and medications may be needed  [facility] has a dietician service available to educate you about a diabetic diet  Medicare Preventive Visit Patient Instructions  Thank you for completing your Welcome to Medicare Visit or Medicare Annual Wellness Visit today  Your next wellness visit will be due in one year (6/23/2023)  The screening/preventive services that you may require over the next 5-10 years are detailed below  Some tests may not apply to you based off risk factors and/or age  Screening tests ordered at today's visit but not completed yet may show as past due  Also, please note that scanned in results may not display below  Preventive Screenings:  Service Recommendations Previous Testing/Comments   Colorectal Cancer Screening  · Colonoscopy    · Fecal Occult Blood Test (FOBT)/Fecal Immunochemical Test (FIT)  · Fecal DNA/Cologuard Test  · Flexible Sigmoidoscopy Age: 54-65 years old   Colonoscopy: every 10 years (May be performed more frequently if at higher risk)  OR  FOBT/FIT: every 1 year  OR  Cologuard: every 3 years  OR  Sigmoidoscopy: every 5 years  Screening may be recommended earlier than age 48 if at higher risk for colorectal cancer  Also, an individualized decision between you and your healthcare provider will decide whether screening between the ages of 74-80 would be appropriate   Colonoscopy: 05/25/2022  FOBT/FIT: Not on file  Cologuard: Not on file  Sigmoidoscopy: Not on file    Screening Current     Prostate Cancer Screening Individualized decision between patient and health care provider in men between ages of 53-78   Medicare will cover every 12 months beginning on the day after your 50th birthday PSA: 2 7 ng/mL     Screening Current     Hepatitis C Screening Once for adults born between [facility]  More frequently in patients at high risk for Hepatitis C Hep C Antibody: Not on file    Screening Current   Diabetes Screening 1-2 times per year if you're at risk for diabetes or have pre-diabetes Fasting glucose: 137 mg/dL   A1C: 7 4 %    Screening Current   Cholesterol Screening Once every 5 years if you don't have a lipid disorder  May order more often based on risk factors  Lipid panel: 04/06/2022    Screening Not Indicated  History Lipid Disorder      Other Preventive Screenings Covered by Medicare:  1  Abdominal Aortic Aneurysm (AAA) Screening: covered once if your at risk  You're considered to be at risk if you have a family history of AAA or a male between the age of 73-68 who smoking at least 100 cigarettes in your lifetime  2  Lung Cancer Screening: covers low dose CT scan once per year if you meet all of the following conditions: (1) Age 50-69; (2) No signs or symptoms of lung cancer; (3) Current smoker or have quit smoking within the last 15 years; (4) You have a tobacco smoking history of at least 30 pack years (packs per day x number of years you smoked); (5) You get a written order from a healthcare provider  3  Glaucoma Screening: covered annually if you're considered high risk: (1) You have diabetes OR (2) Family history of glaucoma OR (3)  aged 48 and older OR (3)  American aged 72 and older  3  Osteoporosis Screening: covered every 2 years if you meet one of the following conditions: (1) Have a vertebral abnormality; (2) On glucocorticoid therapy for more than 3 months; (3) Have primary hyperparathyroidism; (4) On osteoporosis medications and need to assess response to drug therapy  5  HIV Screening: covered annually if you're between the age of 12-76  Also covered annually if you are younger than 13 and older than 72 with risk factors for HIV infection  For pregnant patients, it is covered up to 3 times per pregnancy      Immunizations:  Immunization Recommendations   Influenza Vaccine Annual influenza vaccination during flu season is recommended for all persons aged >= 6 months who do not have contraindications Pneumococcal Vaccine (Prevnar and Pneumovax)  * Prevnar = PCV13  * Pneumovax = PPSV23 Adults 25-60 years old: 1-3 doses may be recommended based on certain risk factors  Adults 72 years old: Prevnar (PCV13) vaccine recommended followed by Pneumovax (PPSV23) vaccine  If already received PPSV23 since turning 65, then PCV13 recommended at least one year after PPSV23 dose  Hepatitis B Vaccine 3 dose series if at intermediate or high risk (ex: diabetes, end stage renal disease, liver disease)   Tetanus (Td) Vaccine - COST NOT COVERED BY MEDICARE PART B Following completion of primary series, a booster dose should be given every 10 years to maintain immunity against tetanus  Td may also be given as tetanus wound prophylaxis  Tdap Vaccine - COST NOT COVERED BY MEDICARE PART B Recommended at least once for all adults  For pregnant patients, recommended with each pregnancy  Shingles Vaccine (Shingrix) - COST NOT COVERED BY MEDICARE PART B  2 shot series recommended in those aged 48 and above     Health Maintenance Due:      Topic Date Due    Hepatitis C Screening  05/06/2035 (Originally 1950)    Colorectal Cancer Screening  05/25/2023     Immunizations Due:      Topic Date Due    COVID-19 Vaccine (3 - Booster for Moderna series) 03/15/2022    Influenza Vaccine (Season Ended) 09/01/2022     Advance Directives   What are advance directives? Advance directives are legal documents that state your wishes and plans for medical care  These plans are made ahead of time in case you lose your ability to make decisions for yourself  Advance directives can apply to any medical decision, such as the treatments you want, and if you want to donate organs  What are the types of advance directives? There are many types of advance directives, and each state has rules about how to use them  You may choose a combination of any of the following:  · Living will: This is a written record of the treatment you want   You can also choose which treatments you do not want, which to limit, and which to stop at a certain time  This includes surgery, medicine, IV fluid, and tube feedings  · Durable power of  for healthcare [facility]): This is a written record that states who you want to make healthcare choices for you when you are unable to make them for yourself  This person, called a proxy, is usually a family member or a friend  You may choose more than 1 proxy  · Do not resuscitate (DNR) order:  A DNR order is used in case your heart stops beating or you stop breathing  It is a request not to have certain forms of treatment, such as CPR  A DNR order may be included in other types of advance directives  · Medical directive: This covers the care that you want if you are in a coma, near death, or unable to make decisions for yourself  You can list the treatments you want for each condition  Treatment may include pain medicine, surgery, blood transfusions, dialysis, IV or tube feedings, and a ventilator (breathing machine)  · Values history: This document has questions about your views, beliefs, and how you feel and think about life  This information can help others choose the care that you would choose  Why are advance directives important? An advance directive helps you control your care  Although spoken wishes may be used, it is better to have your wishes written down  Spoken wishes can be misunderstood, or not followed  Treatments may be given even if you do not want them  An advance directive may make it easier for your family to make difficult choices about your care  Weight Management   Why it is important to manage your weight:  Being overweight increases your risk of health conditions such as heart disease, high blood pressure, type 2 diabetes, and certain types of cancer  It can also increase your risk for osteoarthritis, sleep apnea, and other respiratory problems  Aim for a slow, steady weight loss   Even a small amount of weight loss can lower your risk of health problems  How to lose weight safely:  A safe and healthy way to lose weight is to eat fewer calories and get regular exercise  You can lose up about 1 pound a week by decreasing the number of calories you eat by 500 calories each day  Healthy meal plan for weight management:  A healthy meal plan includes a variety of foods, contains fewer calories, and helps you stay healthy  A healthy meal plan includes the following:  · Eat whole-grain foods more often  A healthy meal plan should contain fiber  Fiber is the part of grains, fruits, and vegetables that is not broken down by your body  Whole-grain foods are healthy and provide extra fiber in your diet  Some examples of whole-grain foods are whole-wheat breads and pastas, oatmeal, brown rice, and bulgur  · Eat a variety of vegetables every day  Include dark, leafy greens such as spinach, kale, nikky greens, and mustard greens  Eat yellow and orange vegetables such as carrots, sweet potatoes, and winter squash  · Eat a variety of fruits every day  Choose fresh or canned fruit (canned in its own juice or light syrup) instead of juice  Fruit juice has very little or no fiber  · Eat low-fat dairy foods  Drink fat-free (skim) milk or 1% milk  Eat fat-free yogurt and low-fat cottage cheese  Try low-fat cheeses such as mozzarella and other reduced-fat cheeses  · Choose meat and other protein foods that are low in fat  Choose beans or other legumes such as split peas or lentils  Choose fish, skinless poultry (chicken or turkey), or lean cuts of red meat (beef or pork)  Before you cook meat or poultry, cut off any visible fat  · Use less fat and oil  Try baking foods instead of frying them  Add less fat, such as margarine, sour cream, regular salad dressing and mayonnaise to foods  Eat fewer high-fat foods  Some examples of high-fat foods include french fries, doughnuts, ice cream, and cakes    · Eat fewer sweets  Limit foods and drinks that are high in sugar  This includes candy, cookies, regular soda, and sweetened drinks  Exercise:  Exercise at least 30 minutes per day on most days of the week  Some examples of exercise include walking, biking, dancing, and swimming  You can also fit in more physical activity by taking the stairs instead of the elevator or parking farther away from stores  Ask your healthcare provider about the best exercise plan for you  © Copyright [employer] 2018 Information is for End User's use only and may not be sold, redistributed or otherwise used for commercial purposes   All illustrations and images included in CareNotes® are the copyrighted property of A D A M , Inc  or [address] [place] [address]

## 2022-06-21 NOTE — PROGRESS NOTES
Assessment/Plan:    No problem-specific Assessment & Plan notes found for this encounter  AR stable  GERD stable on ppi, use prn, risks aware  ED on viagra, refilled, risks/use reviewed  DM2 criteria for dx and tx reviewed, recheck     Diagnoses and all orders for this visit:    Medicare annual wellness visit, subsequent    Seasonal allergic rhinitis, unspecified trigger    Gastroesophageal reflux disease, unspecified whether esophagitis present    Erectile dysfunction, unspecified erectile dysfunction type  -     sildenafil (VIAGRA) 100 mg tablet; Take 1 tablet (100 mg total) by mouth daily as needed for erectile dysfunction    Allergic conjunctivitis of both eyes    Elevated glucose  -     Comprehensive metabolic panel; Future  -     Hemoglobin A1C; Future    Prostate cancer screening  -     PSA, Total Screen; Future      Return in about 6 months (around 12/21/2022) for Recheck  Subjective:      Patient ID: Graham Holland is a 70 y o  male  Chief Complaint   Patient presents with    Follow-up     6mo f/u  Jmcma         HPI  Limits sugar per pt  On nexium from gi  Avoids sugary drinks, more water  Not limiting carbs  Daily exercise, bicycle, walk    cialis helps somewhat  Tolerated  Not used often  Wants to try viagra    AR better on flonase and patanol    The following portions of the patient's history were reviewed and updated as appropriate: allergies, current medications, past family history, past medical history, past social history, past surgical history and problem list     Review of Systems   Constitutional: Negative for chills and fever           Current Outpatient Medications   Medication Sig Dispense Refill    atorvastatin (LIPITOR) 20 mg tablet Take 1 tablet (20 mg total) by mouth daily (Patient taking differently: Take 20 mg by mouth every morning) 30 tablet 6    meclizine (ANTIVERT) 25 mg tablet Take 1 tablet (25 mg total) by mouth every 8 (eight) hours as needed for dizziness 30 tablet 0  olopatadine (PATANOL) 0 1 % ophthalmic solution Administer 1 drop to both eyes 2 (two) times a day (Patient taking differently: Administer 1 drop to both eyes 2 (two) times a day as needed) 5 mL 5    sildenafil (VIAGRA) 100 mg tablet Take 1 tablet (100 mg total) by mouth daily as needed for erectile dysfunction 30 tablet 5    esomeprazole (NexIUM) 20 mg capsule Take 20 mg by mouth every morning before breakfast (Patient not taking: Reported on 6/21/2022)      fluticasone (FLONASE) 50 mcg/act nasal spray 2 sprays into each nostril daily (Patient not taking: Reported on 6/21/2022) 1 Bottle 5     No current facility-administered medications for this visit  Objective:    /68   Pulse 83   Temp (!) 97 3 °F (36 3 °C)   Resp 18   Ht 5' 9" (1 753 m)   Wt 80 5 kg (177 lb 6 4 oz)   SpO2 98%   BMI 26 20 kg/m²        Physical Exam  Vitals and nursing note reviewed  Constitutional:       General: He is not in acute distress  Appearance: He is well-developed  He is not ill-appearing  HENT:      Head: Normocephalic  Right Ear: Tympanic membrane normal       Left Ear: Tympanic membrane normal    Eyes:      General: No scleral icterus  Conjunctiva/sclera: Conjunctivae normal    Cardiovascular:      Rate and Rhythm: Normal rate and regular rhythm  Heart sounds: No murmur heard  Pulmonary:      Effort: Pulmonary effort is normal  No respiratory distress  Breath sounds: No wheezing  Abdominal:      Palpations: Abdomen is soft  Musculoskeletal:         General: No deformity  Cervical back: Neck supple  Skin:     General: Skin is warm and dry  Coloration: Skin is not pale  Neurological:      Mental Status: He is alert  Motor: No weakness  Gait: Gait normal    Psychiatric:         Mood and Affect: Mood normal          Behavior: Behavior normal          Thought Content: Thought content normal        BMI Counseling: Body mass index is 26 2 kg/m²   The BMI is above normal  Nutrition recommendations include decreasing portion sizes and moderation in carbohydrate intake  Exercise recommendations include exercising 3-5 times per week  No pharmacotherapy was ordered  Rationale for BMI follow-up plan is due to patient being overweight or obese              Rafal Carmen, DO

## 2022-06-22 PROBLEM — R73.09 ELEVATED GLUCOSE: Status: ACTIVE | Noted: 2022-06-22

## 2022-06-22 NOTE — PROGRESS NOTES
Assessment and Plan:     Problem List Items Addressed This Visit        Active Problems    ED (erectile dysfunction)    Relevant Medications    sildenafil (VIAGRA) 100 mg tablet    Seasonal allergic rhinitis    Prostate cancer screening    Relevant Orders    PSA, Total Screen    Medicare annual wellness visit, subsequent - Primary    Allergic conjunctivitis of both eyes    GERD (gastroesophageal reflux disease)      Other Visit Diagnoses     Elevated glucose        Relevant Orders    Comprehensive metabolic panel    Hemoglobin A1C           Preventive health issues were discussed with patient, and age appropriate screening tests were ordered as noted in patient's After Visit Summary  Personalized health advice and appropriate referrals for health education or preventive services given if needed, as noted in patient's After Visit Summary       History of Present Illness:     Patient presents for a Medicare Wellness Visit    HPI   Patient Care Team:  Belinda Duque DO as PCP - General (Family Medicine)     Review of Systems:     Review of Systems     Problem List:     Patient Active Problem List   Diagnosis    Primary osteoarthritis of right shoulder    Hyperlipidemia    ED (erectile dysfunction)    GERD (gastroesophageal reflux disease)    Hyperpigmentation of skin    Seasonal allergic rhinitis    Screening for cardiovascular, respiratory, and genitourinary diseases    Screening for diabetes mellitus (DM)    Prostate cancer screening    Colon cancer screening    Medicare annual wellness visit, subsequent    Allergic conjunctivitis of both eyes    Impacted cerumen of right ear    [place] cardiac risk 10-20% in next 10 years    Fingertip eczema    Vertigo    Hiatal hernia    Personal history of colonic polyps      Past Medical and Surgical History:     Past Medical History:   Diagnosis Date    GERD (gastroesophageal reflux disease)     Hiatal hernia     Presence of upper and lower permanent dental bridges     upper/lower    Stroke-like symptom 04/05/2022    Vertigo     Wears glasses     for reading     Past Surgical History:   Procedure Laterality Date    COLONOSCOPY        Family History:     History reviewed  No pertinent family history  Social History:     Social History     Socioeconomic History    Marital status: /Civil Union     Spouse name: None    Number of children: None    Years of education: None    Highest education level: None   Occupational History    None   Tobacco Use    Smoking status: Never Smoker    Smokeless tobacco: Never Used   Substance and Sexual Activity    Alcohol use: Not Currently    Drug use: Never    Sexual activity: None   Other Topics Concern    None   Social History Narrative    None     Social Determinants of Health     Financial Resource Strain: Not on file   Food Insecurity: No Food Insecurity    Worried About Running Out of Food in the Last Year: Never true    Kristofer of Food in the Last Year: Never true   Transportation Needs: No Transportation Needs    Lack of Transportation (Medical): No    Lack of Transportation (Non-Medical):  No   Physical Activity: Not on file   Stress: Not on file   Social Connections: Not on file   Intimate Partner Violence: Not on file   Housing Stability: Low Risk     Unable to Pay for Housing in the Last Year: No    Number of Places Lived in the Last Year: 1    Unstable Housing in the Last Year: No      Medications and Allergies:     Current Outpatient Medications   Medication Sig Dispense Refill    atorvastatin (LIPITOR) 20 mg tablet Take 1 tablet (20 mg total) by mouth daily (Patient taking differently: Take 20 mg by mouth every morning) 30 tablet 6    meclizine (ANTIVERT) 25 mg tablet Take 1 tablet (25 mg total) by mouth every 8 (eight) hours as needed for dizziness 30 tablet 0    olopatadine (PATANOL) 0 1 % ophthalmic solution Administer 1 drop to both eyes 2 (two) times a day (Patient taking differently: Administer 1 drop to both eyes 2 (two) times a day as needed) 5 mL 5    sildenafil (VIAGRA) 100 mg tablet Take 1 tablet (100 mg total) by mouth daily as needed for erectile dysfunction 30 tablet 5    esomeprazole (NexIUM) 20 mg capsule Take 20 mg by mouth every morning before breakfast (Patient not taking: Reported on 6/21/2022)      fluticasone (FLONASE) 50 mcg/act nasal spray 2 sprays into each nostril daily (Patient not taking: Reported on 6/21/2022) 1 Bottle 5     No current facility-administered medications for this visit  No Known Allergies   Immunizations:     Immunization History   Administered Date(s) Administered    COVID-19 MODERNA VACC 0 5 ML IM 08/09/2021, 10/15/2021    INFLUENZA 08/22/2012, 08/24/2017, 09/08/2018    Influenza Split High Dose Preservative Free IM 09/05/2016    Pneumococcal Conjugate 13-Valent 02/15/2016    Pneumococcal Polysaccharide PPV23 06/23/2017    Tdap 03/15/2011      Health Maintenance:         Topic Date Due    Hepatitis C Screening  05/06/2035 (Originally 1950)    Colorectal Cancer Screening  05/25/2023         Topic Date Due    COVID-19 Vaccine (3 - Booster for Moderna series) 03/15/2022    Influenza Vaccine (Season Ended) 09/01/2022      Medicare Screening Tests and Risk Assessments:     Loki Jo is here for his Subsequent Wellness visit  Last Medicare Wellness visit information reviewed, patient interviewed and updates made to the record today  Health Risk Assessment:   Patient rates overall health as excellent  Patient feels that their physical health rating is much better  Patient is satisfied with their life  Eyesight was rated as same  Hearing was rated as same  Patient feels that their emotional and mental health rating is slightly better  Patients states they are never, rarely angry  Patient states they are sometimes unusually tired/fatigued  Pain experienced in the last 7 days has been some  Patient's pain rating has been 3/10  Patient states that he has experienced no weight loss or gain in last 6 months  Fall Risk Screening: In the past year, patient has experienced: no history of falling in past year      Home Safety:  Patient does not have trouble with stairs inside or outside of their home  Patient has working smoke alarms Home safety hazards include: none  Nutrition:   Current diet is No Added Salt and Limited junk food  Medications:   Patient is not currently taking any over-the-counter supplements  Patient is able to manage medications  Activities of Daily Living (ADLs)/Instrumental Activities of Daily Living (IADLs):   Walk and transfer into and out of bed and chair?: Yes  Dress and groom yourself?: Yes    Bathe or shower yourself?: Yes    Feed yourself?  Yes  Do your laundry/housekeeping?: Yes  Manage your money, pay your bills and track your expenses?: Yes  Make your own meals?: Yes    Do your own shopping?: Yes    Previous Hospitalizations:   Any hospitalizations or ED visits within the last 12 months?: Yes    How many hospitalizations have you had in the last year?: 1-2    Advance Care Planning:   Living will: No    Durable POA for healthcare: No    End of Life Decisions reviewed with patient: No      Cognitive Screening:   Provider or family/friend/caregiver concerned regarding cognition?: No    PREVENTIVE SCREENINGS      Cardiovascular Screening:    General: Screening Not Indicated and History Lipid Disorder      Diabetes Screening:     General: Screening Current      Colorectal Cancer Screening:     General: Screening Current      Prostate Cancer Screening:    General: Screening Current      Osteoporosis Screening:    General: Screening Not Indicated      Abdominal Aortic Aneurysm (AAA) Screening:    Risk factors include: age between 73-67 yo        General: Screening Not Indicated      Lung Cancer Screening:     General: Screening Not Indicated      Hepatitis C Screening:    General: Screening Current    Screening, Brief Intervention, and Referral to Treatment (SBIRT)    Screening  Typical number of drinks in a day: 0  Typical number of drinks in a week: 0  Interpretation: Low risk drinking behavior  Single Item Drug Screening:  How often have you used an illegal drug (including marijuana) or a prescription medication for non-medical reasons in the past year? never    Single Item Drug Screen Score: 0  Interpretation: Negative screen for possible drug use disorder    Other Counseling Topics:   Car/seat belt/driving safety and regular weightbearing exercise       No exam data present     Physical Exam:     /68   Pulse 83   Temp (!) 97 3 °F (36 3 °C)   Resp 18   Ht 5' 9" (1 753 m)   Wt 80 5 kg (177 lb 6 4 oz)   SpO2 98%   BMI 26 20 kg/m²     Physical Exam     Irma Tyler, DO

## 2022-07-26 ENCOUNTER — TELEPHONE (OUTPATIENT)
Dept: NEUROLOGY | Facility: CLINIC | Age: 72
End: 2022-07-26

## 2022-08-11 NOTE — TELEPHONE ENCOUNTER
Contacted the patient to confirm the appointment on 08/12/22, the patient requested the appointment be canceled  The patient states that he will call back to schedule another appointment

## 2022-08-18 ENCOUNTER — EVALUATION (OUTPATIENT)
Dept: PHYSICAL THERAPY | Facility: CLINIC | Age: 72
End: 2022-08-18
Payer: MEDICARE

## 2022-08-18 DIAGNOSIS — R42 VERTIGO: Primary | ICD-10-CM

## 2022-08-18 DIAGNOSIS — H81.20: ICD-10-CM

## 2022-08-18 DIAGNOSIS — H81.12 BPPV (BENIGN PAROXYSMAL POSITIONAL VERTIGO), LEFT: ICD-10-CM

## 2022-08-18 PROCEDURE — 97162 PT EVAL MOD COMPLEX 30 MIN: CPT

## 2022-08-18 PROCEDURE — 95992 CANALITH REPOSITIONING PROC: CPT

## 2022-08-18 NOTE — PATIENT INSTRUCTIONS
Performed Left CRT x 1- no symptoms with treatment  Instructed to sit in waiting room for 15 minutes prior to leaving and make sure he is asymptomatic prior to driving- patient voiced understanding

## 2022-08-18 NOTE — PROGRESS NOTES
PT Evaluation     Today's date: 2022  Patient name: Miguel Angel Burris  : 1950  MRN: [id number]  Referring provider: Suzy Shipman MD  Dx:   Encounter Diagnosis     ICD-10-CM    1  Vertigo  R42                   Assessment  Assessment details: Presents with positive left head impulse, positional dizziness with notable nystagmus with positional change likely due to uncompensated left peripheral vestibular hypofunction impairing ADL's and function  He may also have Left BPPV superimposed on the above symptoms, so we will treat the left BPPV immediately to rule that out as a cause  He did demonstrate left facial droop with limited smile, however, the patient reports this is "normal" for his facial symmetry  Recommend skilled PT to address goals below  I explained findings to the patient and he is agreeable to the plan  Impairments: abnormal gait and impaired balance  Understanding of Dx/Px/POC: excellent  Goals  STG (2 weeks)  1  Independent with HEP  2  Assess FGA  3 (-) for BPPV    LTG (6 weeks)  1  > 24 FGA to reduce fall risk  2  Reduce positional dizziness by 75% to improve ADL's and function  3  Perform all ADL's/ get OOB with minimal to no symptoms to facilitate normal function    Plan  Plan details: Patient to be seen 2x the first week, then 1x week for 4-6 weeks to address goals in plan of care  Planned therapy interventions: patient education, therapeutic activities, therapeutic exercise, home exercise program, canalith repositioning and balance  Frequency: 2x week  Duration in weeks: 6        Subjective Evaluation    History of Present Illness  Mechanism of injury: Patient is a 71 yo male who reports going to the hospital in 2022 due to vertigo/ room spinning  He underwent a medical workup which was inconclusive for systemic cause  He was recommended to come to PT upon discharge   He reports his symptoms resolved but returned 3-4 weeks ago, which he reports a spinning sensation lasting a few minutes  He denies numbness/ tingling, headache, weakness or other complaint  His goal of PT is to alleviate his positional dizziness  He reports long standing occasional tinnitus, but denies aural pain or pressure  PMHx/ Meds:   Allergic conjunctivitis of both eyes    olopatadine (PATANOL) 0 1 % ophthalmic solution 1 drop, 2 times daily          Patient taking differently: 1 drop Both Eyes 2 times daily PRN, Reported on 5/25/2022      Dyslipidemia    atorvastatin (LIPITOR) 20 mg tablet 20 mg, Daily          Patient taking differently: 20 mg Oral Every morning, Reported on 5/25/2022      Erectile dysfunction, unspecified erectile dysfunction type    sildenafil (VIAGRA) 100 mg tablet 100 mg, Daily PRN         Seasonal allergic rhinitis, unspecified trigger    fluticasone (FLONASE) 50 mcg/act nasal spray 2 spray, Daily          Patient not taking  Reported on 6/21/2022      Vertigo    meclizine (ANTIVERT) 25 mg tablet 25 mg, Every 8 hours PRN         Unassociated    esomeprazole (NexIUM) 20 mg capsule              Objective  Vitals- HR 86 BPM, O2 sat 96%, BP sit L arm 126/70    Vision- Full field tracking  CN- II-XII intact except note left facial droop with CN VII  Motor- Normal tone, no clonus or drift  Strength 5/5 except left shoulder 4/5  Sensation- Denies numbness/ tingling t/o  Coordination-Intact finger to nose and AUDRA's UE  Balance- (-) Romberg, (+) TREO  Gait- Ambulates independent,  Normal gait  Vestibular- (-) Right Head Impulse, (+) Left Head Impulse, (-) Right DHP, (+) Left DHP w/ Upbeat/ torsional nystagmus for 60 seconds,  (-) Roll Test for BPPV, note torsional nystagmus (right) with each position and symptoms mimicking complaint      Treated with Left CRT x 1, no symptoms with treatment     Precautions: Vertigo      Manuals                                                                 Neuro Re-Ed             DHP to r/o BPPV             X1 View             X2 View             A-P Sway             Ball Toss F/B gait             Foam ECHT             VOR Cx w/ ball             Ther Ex                                                                                                                     Ther Activity                                       Gait Training                                       Modalities

## 2022-08-19 ENCOUNTER — APPOINTMENT (OUTPATIENT)
Dept: PHYSICAL THERAPY | Facility: CLINIC | Age: 72
End: 2022-08-19
Payer: MEDICARE

## 2022-08-22 ENCOUNTER — OFFICE VISIT (OUTPATIENT)
Dept: PHYSICAL THERAPY | Facility: CLINIC | Age: 72
End: 2022-08-22
Payer: MEDICARE

## 2022-08-22 DIAGNOSIS — R42 VERTIGO: Primary | ICD-10-CM

## 2022-08-22 DIAGNOSIS — H81.20: ICD-10-CM

## 2022-08-22 DIAGNOSIS — H81.12 BPPV (BENIGN PAROXYSMAL POSITIONAL VERTIGO), LEFT: ICD-10-CM

## 2022-08-22 PROCEDURE — 97112 NEUROMUSCULAR REEDUCATION: CPT

## 2022-08-22 NOTE — PROGRESS NOTES
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  H81 20    3  BPPV (benign paroxysmal positional vertigo), left  H81 12                   Subjective: Patient seen in PT  Reports symptoms are "much better" since last seen  No reported vertigo since last seen  Reports able to perform all ADL's without symptoms and that he feels back "to normal"      Objective: See treatment diary below:    (-) [place]-Hallpike and Roll Test B/L  Gait Speed= 1 34 m/second  Gait- normal gait, no veer with head turns  Balance- (-) TREO, (-) TREC  Functional Gait Assessment-       Assessment: Patient demonstrates no positional dizziness and balance appears at baseline  BPPV appears resolved and was likely primary  of his symptoms  He has clinical findings of hypofunction ((+) Head Impulse), but likely compensated as dizziness and initial imbalance appears related to BPPV  Met all PT goals and patient is able to function with all ADL's without symptoms  I spoke with patient at length and he is agreeable to stop PT due to symptom resolution, he will continue with his HEP  No need for further PT at this time  He will call us if his symptoms return as BPPV has a 33% reoccurrence rate within one year  Plan: Discharge PT  Thank you for the consult       Precautions: Vertigo      Manuals                                                                 Neuro Re-Ed             DHP to r/o BPPV             X1 View             X2 View             A-P Sway             Ball Toss F/B gait             Foam ECHT             VOR Cx w/ ball             Ther Ex                                                                                                                     Ther Activity Gait Training                                       Modalities

## 2022-08-23 ENCOUNTER — APPOINTMENT (OUTPATIENT)
Dept: PHYSICAL THERAPY | Facility: CLINIC | Age: 72
End: 2022-08-23
Payer: MEDICARE

## 2022-10-12 PROBLEM — Z13.89 SCREENING FOR CARDIOVASCULAR, RESPIRATORY, AND GENITOURINARY DISEASES: Status: RESOLVED | Noted: 2021-05-06 | Resolved: 2022-10-12

## 2022-10-12 PROBLEM — Z00.00 MEDICARE ANNUAL WELLNESS VISIT, SUBSEQUENT: Status: RESOLVED | Noted: 2021-05-06 | Resolved: 2022-10-12

## 2022-10-12 PROBLEM — Z13.6 SCREENING FOR CARDIOVASCULAR, RESPIRATORY, AND GENITOURINARY DISEASES: Status: RESOLVED | Noted: 2021-05-06 | Resolved: 2022-10-12

## 2022-10-12 PROBLEM — Z12.5 PROSTATE CANCER SCREENING: Status: RESOLVED | Noted: 2021-05-06 | Resolved: 2022-10-12

## 2022-10-12 PROBLEM — H61.21 IMPACTED CERUMEN OF RIGHT EAR: Status: RESOLVED | Noted: 2021-05-06 | Resolved: 2022-10-12

## 2022-10-12 PROBLEM — H10.13 ALLERGIC CONJUNCTIVITIS OF BOTH EYES: Status: RESOLVED | Noted: 2021-05-06 | Resolved: 2022-10-12

## 2022-10-12 PROBLEM — Z13.1 SCREENING FOR DIABETES MELLITUS (DM): Status: RESOLVED | Noted: 2021-05-06 | Resolved: 2022-10-12

## 2022-10-12 PROBLEM — Z13.83 SCREENING FOR CARDIOVASCULAR, RESPIRATORY, AND GENITOURINARY DISEASES: Status: RESOLVED | Noted: 2021-05-06 | Resolved: 2022-10-12

## 2022-10-12 PROBLEM — Z12.11 COLON CANCER SCREENING: Status: RESOLVED | Noted: 2021-05-06 | Resolved: 2022-10-12

## 2022-12-27 LAB
ALBUMIN SERPL-MCNC: 4.3 G/DL (ref 3.6–5.1)
ALBUMIN/GLOB SERPL: 1.3 (CALC) (ref 1–2.5)
ALP SERPL-CCNC: 79 U/L (ref 35–144)
ALT SERPL-CCNC: 33 U/L (ref 9–46)
AST SERPL-CCNC: 20 U/L (ref 10–35)
BILIRUB SERPL-MCNC: 0.8 MG/DL (ref 0.2–1.2)
BUN SERPL-MCNC: 8 MG/DL (ref 7–25)
BUN/CREAT SERPL: ABNORMAL (CALC) (ref 6–22)
CALCIUM SERPL-MCNC: 9.4 MG/DL (ref 8.6–10.3)
CHLORIDE SERPL-SCNC: 102 MMOL/L (ref 98–110)
CO2 SERPL-SCNC: 32 MMOL/L (ref 20–32)
CREAT SERPL-MCNC: 0.96 MG/DL (ref 0.7–1.28)
GFR/BSA.PRED SERPLBLD CYS-BASED-ARV: 84 ML/MIN/1.73M2
GLOBULIN SER CALC-MCNC: 3.2 G/DL (CALC) (ref 1.9–3.7)
GLUCOSE SERPL-MCNC: 230 MG/DL (ref 65–99)
HBA1C MFR BLD: 10.8 % OF TOTAL HGB
POTASSIUM SERPL-SCNC: 3.9 MMOL/L (ref 3.5–5.3)
PROT SERPL-MCNC: 7.5 G/DL (ref 6.1–8.1)
PSA SERPL-MCNC: 4.1 NG/ML
SODIUM SERPL-SCNC: 141 MMOL/L (ref 135–146)

## 2023-01-02 PROBLEM — R97.20 ELEVATED PSA: Status: ACTIVE | Noted: 2023-01-02

## 2023-01-02 PROBLEM — N18.2 TYPE 2 DIABETES MELLITUS WITH STAGE 2 CHRONIC KIDNEY DISEASE, WITHOUT LONG-TERM CURRENT USE OF INSULIN (HCC): Status: ACTIVE | Noted: 2022-06-22

## 2023-01-02 PROBLEM — N18.2 CKD STAGE 2 DUE TO TYPE 2 DIABETES MELLITUS (HCC): Status: ACTIVE | Noted: 2023-01-02

## 2023-01-02 PROBLEM — E11.22 TYPE 2 DIABETES MELLITUS WITH STAGE 2 CHRONIC KIDNEY DISEASE, WITHOUT LONG-TERM CURRENT USE OF INSULIN (HCC): Status: ACTIVE | Noted: 2022-06-22

## 2023-01-02 PROBLEM — E11.22 CKD STAGE 2 DUE TO TYPE 2 DIABETES MELLITUS (HCC): Status: ACTIVE | Noted: 2023-01-02

## 2023-01-06 ENCOUNTER — OFFICE VISIT (OUTPATIENT)
Dept: FAMILY MEDICINE CLINIC | Facility: CLINIC | Age: 73
End: 2023-01-06

## 2023-01-06 VITALS
RESPIRATION RATE: 14 BRPM | DIASTOLIC BLOOD PRESSURE: 60 MMHG | WEIGHT: 166.6 LBS | HEIGHT: 69 IN | OXYGEN SATURATION: 97 % | HEART RATE: 86 BPM | BODY MASS INDEX: 24.68 KG/M2 | SYSTOLIC BLOOD PRESSURE: 128 MMHG | TEMPERATURE: 97.2 F

## 2023-01-06 DIAGNOSIS — N18.2 TYPE 2 DIABETES MELLITUS WITH STAGE 2 CHRONIC KIDNEY DISEASE, WITHOUT LONG-TERM CURRENT USE OF INSULIN (HCC): Primary | ICD-10-CM

## 2023-01-06 DIAGNOSIS — E78.49 OTHER HYPERLIPIDEMIA: ICD-10-CM

## 2023-01-06 DIAGNOSIS — R97.20 ELEVATED PSA: ICD-10-CM

## 2023-01-06 DIAGNOSIS — E11.22 TYPE 2 DIABETES MELLITUS WITH STAGE 2 CHRONIC KIDNEY DISEASE, WITHOUT LONG-TERM CURRENT USE OF INSULIN (HCC): Primary | ICD-10-CM

## 2023-01-06 DIAGNOSIS — E11.22 CKD STAGE 2 DUE TO TYPE 2 DIABETES MELLITUS (HCC): ICD-10-CM

## 2023-01-06 DIAGNOSIS — N18.2 CKD STAGE 2 DUE TO TYPE 2 DIABETES MELLITUS (HCC): ICD-10-CM

## 2023-01-06 RX ORDER — METFORMIN HYDROCHLORIDE 500 MG/1
1000 TABLET, EXTENDED RELEASE ORAL
Qty: 60 TABLET | Refills: 5 | Status: SHIPPED | OUTPATIENT
Start: 2023-01-06

## 2023-01-06 NOTE — PATIENT INSTRUCTIONS
TEST STRIPS for Diabetes monitoring can be prescribed to your pharmacy  Since the test strips are unique to the [facility] of the glucose meter, it is in your best economic interest to find out the SPECIFIC BRAND and TYPE of test strip covered by your insurance company  You can find this out from your insurance company or participating pharmacy  Once you obtain this information, we can prescribe the specific branded test strip of your choosing  As a Type 2 Diabetic, it would be informative to check and record your blood sugars at home to help manage sugar control and identify any fluctuations that may be due to diet/exercise  You can check your sugar ONCE A DAY at [facility] DIFFERENT times in an alternating fashion: (1) before BREAKFAST, then the next day (2) before LUNCH, the following day (3) before DINNER, and the following day (4) before BEDTIME (then start over again)  Identifying trends may help you determine if changes are needed to what you eat, when you eat it, your activity level, and your medications  You should bring this records with you to your Diabetes follow-up appointments every 3 months  Please see an eye doctor once a year for Diabetic eye examinations

## 2023-01-06 NOTE — PROGRESS NOTES
Assessment/Plan:    No problem-specific Assessment & Plan notes found for this encounter  DM2 new  Start metformin 500mg/d for first 7d  Side effects/risks/benefits advised    Diet education at [facility] exam suggested    Other tx options discussed and written for pt    Suggest smbg qd as instructed    Elevated psa, suggest urology, referral given    On statin, continue for risk reduction    Ckd2, stay hydrated, has polyuria and polydipsia     Diagnoses and all orders for this visit:    Type 2 diabetes mellitus with stage 2 chronic kidney disease, without long-term current use of insulin ([facility] [address] )  -     Comprehensive metabolic panel; Future  -     Microalbumin / creatinine urine ratio; Future  -     Hemoglobin A1C; Future  -     metFORMIN (GLUCOPHAGE-XR) 500 mg 24 hr tablet; Take 2 tablets (1,000 mg total) by mouth daily with dinner    CKD stage 2 due to type 2 diabetes mellitus (HCC)    Elevated PSA  -     Ambulatory referral to Urology; Future    Other hyperlipidemia      Return in about 3 months (around 4/6/2023) for Recheck  Subjective:      Patient ID: Marcos David is a 67 y o  male  Chief Complaint   Patient presents with   • Follow-up     kla       HPI  Labs reviewed  Aware of DM2 criteria  Has polydipsia/uria  Urine flow ok    The following portions of the patient's history were reviewed and updated as appropriate: allergies, current medications, past family history, past medical history, past social history, past surgical history and problem list     Review of Systems   Constitutional: Negative for chills and fever  Gastrointestinal: Negative for abdominal pain           Current Outpatient Medications   Medication Sig Dispense Refill   • atorvastatin (LIPITOR) 20 mg tablet Take 1 tablet (20 mg total) by mouth daily (Patient taking differently: Take 20 mg by mouth every morning) 30 tablet 6   • esomeprazole (NexIUM) 20 mg capsule Take 20 mg by mouth every morning before breakfast     • metFORMIN (GLUCOPHAGE-XR) 500 mg 24 hr tablet Take 2 tablets (1,000 mg total) by mouth daily with dinner 60 tablet 5   • sildenafil (VIAGRA) 100 mg tablet Take 1 tablet (100 mg total) by mouth daily as needed for erectile dysfunction 30 tablet 5     No current facility-administered medications for this visit  Objective:    /60   Pulse 86   Temp (!) 97 2 °F (36 2 °C)   Resp 14   Ht 5' 9" (1 753 m)   Wt 75 6 kg (166 lb 9 6 oz)   SpO2 97%   BMI 24 60 kg/m²        Physical Exam  Vitals and nursing note reviewed  Constitutional:       General: He is not in acute distress  Appearance: He is well-developed  He is not ill-appearing  HENT:      Head: Normocephalic  Right Ear: Tympanic membrane normal       Left Ear: Tympanic membrane normal    Eyes:      General: No scleral icterus  Conjunctiva/sclera: Conjunctivae normal    Neck:      Vascular: No carotid bruit  Cardiovascular:      Rate and Rhythm: Normal rate and regular rhythm  Pulses: no weak pulses          Dorsalis pedis pulses are 2+ on the right side and 2+ on the left side  Heart sounds: No murmur heard  Pulmonary:      Effort: Pulmonary effort is normal  No respiratory distress  Breath sounds: No wheezing  Abdominal:      Palpations: Abdomen is soft  Musculoskeletal:         General: No deformity  Cervical back: Neck supple  Right lower leg: No edema  Left lower leg: No edema  Skin:     General: Skin is warm and dry  Coloration: Skin is not pale  Neurological:      Mental Status: He is alert  Motor: No weakness  Gait: Gait normal    Psychiatric:         Mood and Affect: Mood normal          Behavior: Behavior normal          Thought Content: Thought content normal          Diabetic Foot Exam    Patient's shoes and socks removed  Right Foot/Ankle   Right Foot Inspection  Skin Exam: skin intact  No abnormal color       Sensory   Monofilament testing: intact    Vascular  The right DP pulse is 2+  Left Foot/Ankle  Left Foot Inspection  Skin Exam: skin intact  Normal color  Sensory   Monofilament testing: intact    Vascular  The left DP pulse is 2+  Assign Risk Category  No deformity present  No loss of protective sensation  No weak pulses  Risk: 0     41 minutes spent with patient and with chart review and documentation completion        Yetta Comings, DO

## 2023-01-16 ENCOUNTER — TELEPHONE (OUTPATIENT)
Dept: FAMILY MEDICINE CLINIC | Facility: CLINIC | Age: 73
End: 2023-01-16

## 2023-01-16 NOTE — TELEPHONE ENCOUNTER
Pt would like dr to change the Metformin to a lower dose   He said it id giving him diajaredea  Please have doctor call patient

## 2023-01-16 NOTE — TELEPHONE ENCOUNTER
Sw pt  He can try 500mg qd for 2w then 500mg bid if tolerated since 2 po qd caused diarrhea  If no better we could offer jardiance  He is aware

## 2023-01-16 NOTE — TELEPHONE ENCOUNTER
He was supposed to be on 500mg qd for 7days then 1000mg qd after that  Which dose did he get diarrhea with, 500mg or 1000mg  If diarrhea continued while on 500mg, then a different medication will be recommended

## 2023-01-16 NOTE — TELEPHONE ENCOUNTER
Patient stated that he had switch to the 1000mg everyday when he started his diarrhea    Wash Grippe

## 2023-06-01 ENCOUNTER — TELEPHONE (OUTPATIENT)
Dept: UROLOGY | Facility: AMBULATORY SURGERY CENTER | Age: 73
End: 2023-06-01

## 2023-06-01 NOTE — TELEPHONE ENCOUNTER
New Patient    What is the reason for the patient’s appointment?: Patient being referred for elevated PSA 4 10    What office location does the patient prefer?: [place]     Does patient have Imaging/Lab Results: PSA done on 12/27/22 was 4 10     Have patient records been requested?:  If No, are the records showing in Epic:       INSURANCE:  Do we accept the patient's insurance or is the patient Self-Pay?: Yes     Insurance Provider:  Medicare A and B  Plan Type/Number:  Member ID#:       HISTORY:   Has the patient had any previous Urologist(s)?: No    Was the patient seen in the ED?: No    Has the patient had any outside testing done?:    Does the patient have a personal history of cancer?: No    Patient scheduled on 6/14 at 10 am with Chris Cool in [place]

## 2023-06-14 ENCOUNTER — OFFICE VISIT (OUTPATIENT)
Dept: UROLOGY | Facility: CLINIC | Age: 73
End: 2023-06-14
Payer: MEDICARE

## 2023-06-14 VITALS
OXYGEN SATURATION: 95 % | RESPIRATION RATE: 16 BRPM | WEIGHT: 172.2 LBS | DIASTOLIC BLOOD PRESSURE: 80 MMHG | SYSTOLIC BLOOD PRESSURE: 140 MMHG | HEART RATE: 101 BPM | HEIGHT: 69 IN | BODY MASS INDEX: 25.51 KG/M2

## 2023-06-14 DIAGNOSIS — R97.20 ELEVATED PSA: ICD-10-CM

## 2023-06-14 LAB — POST-VOID RESIDUAL VOLUME, ML POC: 54 ML

## 2023-06-14 PROCEDURE — 99203 OFFICE O/P NEW LOW 30 MIN: CPT

## 2023-06-14 PROCEDURE — 51798 US URINE CAPACITY MEASURE: CPT

## 2023-06-14 NOTE — PROGRESS NOTES
Office Visit- Urology  Abbe Mccann 1950 MRN: [id number]      Assessment/Discussion/Plan    67 y o  male managed by     1  Elevated PSA  -Last PSA was 4 1 in December 2022  Previously 2 7 in July 2021  -SARTHAK today without any findings concerning for prostate cancer   -Discussed further evaluation with repeat PSA versus obtainment of MRI of the prostate    -At this time patient will repeat PSA  Advised patient to avoid PSA obtainment for 2 weeks due SARTHAK today and to avoid sexual activity, bike riding, or surrounding prostate obtain a PSA  If still elevated will then obtain MRI of the prostate        Chief Complaint:   Ashley Sloan is a 67 y o  male presenting to the office for an initial evaluation regarding  Elevated PSA        Subjective    -70-year-old male  -No significant past urologic history  -Presents to the office for evaluation of PSA of 4 1 in December 2022  PSA previously 2 7 in July 2021  -Patient had urinary frequency in the past but since resolved  -He is not currently taking any medications for his prostate including finasteride  -Denies any obstructive urinary symptoms  -Denies gross hematuria  -Denies past procedures on kidneys, bladder, prostate  -Has never been told he had enlarged prostate on past SARTHAK  -No significant family history  No known family history of prostate cancer  2 uncles with BPH  -He has taken Viagra 100 mg with some effect          ROS:   Review of Systems   Constitutional: Negative  Negative for chills, fatigue and fever  HENT: Negative  Eyes: Negative  Respiratory: Negative  Negative for shortness of breath  Cardiovascular: Negative  Negative for chest pain  Gastrointestinal: Negative  Endocrine: Negative  Musculoskeletal: Negative  Allergic/Immunologic: Negative  Neurological: Negative  Psychiatric/Behavioral: Negative            Past Medical History  Past Medical History:   Diagnosis Date   • GERD (gastroesophageal reflux disease)    • Hiatal hernia    • Presence of upper and lower permanent dental bridges     upper/lower   • Stroke-like symptom 04/05/2022   • Vertigo    • Wears glasses     for reading       Past Surgical History  Past Surgical History:   Procedure Laterality Date   • COLONOSCOPY         Past Family History  History reviewed  No pertinent family history  Past Social history  Social History     Socioeconomic History   • Marital status: /Civil Union     Spouse name: Not on file   • Number of children: Not on file   • Years of education: Not on file   • Highest education level: Not on file   Occupational History   • Not on file   Tobacco Use   • Smoking status: Never   • Smokeless tobacco: Never   Substance and Sexual Activity   • Alcohol use: Not Currently   • Drug use: Never   • Sexual activity: Not on file   Other Topics Concern   • Not on file   Social History Narrative   • Not on file     Social Determinants of Health     Financial Resource Strain: Not on file   Food Insecurity: No Food Insecurity (4/6/2022)    Hunger Vital Sign    • Worried About Running Out of Food in the Last Year: Never true    • Ran Out of Food in the Last Year: Never true   Transportation Needs: No Transportation Needs (4/6/2022)    PRAPARE - Transportation    • Lack of Transportation (Medical): No    • Lack of Transportation (Non-Medical):  No   Physical Activity: Not on file   Stress: Not on file   Social Connections: Not on file   Intimate Partner Violence: Not on file   Housing Stability: Low Risk  (4/6/2022)    Housing Stability Vital Sign    • Unable to Pay for Housing in the Last Year: No    • Number of Places Lived in the Last Year: 1    • Unstable Housing in the Last Year: No       Current Medications  Current Outpatient Medications   Medication Sig Dispense Refill   • atorvastatin (LIPITOR) 20 mg tablet Take 1 tablet (20 mg total) by mouth daily (Patient taking differently: Take 20 mg by mouth every morning) 30 tablet 6   • esomeprazole "(NexIUM) 20 mg capsule Take 20 mg by mouth every morning before breakfast     • metFORMIN (GLUCOPHAGE-XR) 500 mg 24 hr tablet Take 2 tablets (1,000 mg total) by mouth daily with dinner 60 tablet 5   • sildenafil (VIAGRA) 100 mg tablet Take 1 tablet (100 mg total) by mouth daily as needed for erectile dysfunction 30 tablet 5     No current facility-administered medications for this visit  Allergies  No Known Allergies    OBJECTIVE    Vitals   Vitals:    06/14/23 0948   BP: 140/80   BP Location: Right arm   Patient Position: Sitting   Cuff Size: Large   Pulse: 101   Resp: 16   SpO2: 95%   Weight: 78 1 kg (172 lb 3 2 oz)   Height: 5' 9\" (1 753 m)       PVR:    Physical Exam  Constitutional:       General: He is not in acute distress  Appearance: Normal appearance  He is normal weight  He is not ill-appearing or toxic-appearing  HENT:      Head: Normocephalic and atraumatic  Eyes:      Conjunctiva/sclera: Conjunctivae normal    Cardiovascular:      Rate and Rhythm: Normal rate  Pulmonary:      Effort: Pulmonary effort is normal  No respiratory distress  Genitourinary:     Comments: SARTHAK without any nodules, induration, asymmetry  Musculoskeletal:         General: Normal range of motion  Cervical back: Normal range of motion and neck supple  Skin:     General: Skin is warm and dry  Neurological:      General: No focal deficit present  Mental Status: He is alert and oriented to person, place, and time  Cranial Nerves: No cranial nerve deficit  Psychiatric:         Mood and Affect: Mood normal          Behavior: Behavior normal          Thought Content:  Thought content normal           Labs:     Lab Results   Component Value Date    PSA 4 10 (H) 12/27/2022    PSA 2 7 07/29/2021     Lab Results   Component Value Date    CREATININE 0 96 12/27/2022      Lab Results   Component Value Date    HGBA1C 10 8 (H) 12/27/2022     Lab Results   Component Value Date    BUN 8 12/27/2022    CALCIUM " 9 4 12/27/2022     12/27/2022    CO2 32 12/27/2022    CREATININE 0 96 12/27/2022    K 3 9 12/27/2022       I have personally reviewed all pertinent lab results and reviewed with patient    Imaging       Johana Blackman PA-C  Date: 6/14/2023 Time: 10:15 AM  [facility]    This note was written using fluency dictation software  Please excuse any resulting minor grammatical errors

## 2023-06-14 NOTE — PATIENT INSTRUCTIONS
Please obtain PSA in 2 weeks after visit  Please avid for sexual activity, bike riding, motorcycle riding

## 2023-07-31 LAB
ALBUMIN SERPL-MCNC: 4.2 G/DL (ref 3.6–5.1)
ALBUMIN/CREAT UR: 3 MCG/MG CREAT
ALBUMIN/GLOB SERPL: 1.4 (CALC) (ref 1–2.5)
ALP SERPL-CCNC: 59 U/L (ref 35–144)
ALT SERPL-CCNC: 23 U/L (ref 9–46)
AST SERPL-CCNC: 24 U/L (ref 10–35)
BILIRUB SERPL-MCNC: 0.5 MG/DL (ref 0.2–1.2)
BUN SERPL-MCNC: 14 MG/DL (ref 7–25)
BUN/CREAT SERPL: ABNORMAL (CALC) (ref 6–22)
CALCIUM SERPL-MCNC: 9.3 MG/DL (ref 8.6–10.3)
CHLORIDE SERPL-SCNC: 103 MMOL/L (ref 98–110)
CO2 SERPL-SCNC: 32 MMOL/L (ref 20–32)
CREAT SERPL-MCNC: 1.08 MG/DL (ref 0.7–1.28)
CREAT UR-MCNC: 115 MG/DL (ref 20–320)
GFR/BSA.PRED SERPLBLD CYS-BASED-ARV: 73 ML/MIN/1.73M2
GLOBULIN SER CALC-MCNC: 3 G/DL (CALC) (ref 1.9–3.7)
GLUCOSE SERPL-MCNC: 121 MG/DL (ref 65–99)
HBA1C MFR BLD: 7.2 % OF TOTAL HGB
MICROALBUMIN UR-MCNC: 0.4 MG/DL
POTASSIUM SERPL-SCNC: 4.5 MMOL/L (ref 3.5–5.3)
PROT SERPL-MCNC: 7.2 G/DL (ref 6.1–8.1)
SODIUM SERPL-SCNC: 141 MMOL/L (ref 135–146)

## 2023-08-04 ENCOUNTER — TELEPHONE (OUTPATIENT)
Dept: FAMILY MEDICINE CLINIC | Facility: CLINIC | Age: 73
End: 2023-08-04

## 2023-08-04 NOTE — TELEPHONE ENCOUNTER
Left message for pt to call back to schedule appt for AWV    [facility]

## 2023-08-28 ENCOUNTER — OFFICE VISIT (OUTPATIENT)
Dept: FAMILY MEDICINE CLINIC | Facility: CLINIC | Age: 73
End: 2023-08-28
Payer: MEDICARE

## 2023-08-28 VITALS
HEIGHT: 66 IN | TEMPERATURE: 97.2 F | HEART RATE: 98 BPM | WEIGHT: 172 LBS | DIASTOLIC BLOOD PRESSURE: 72 MMHG | RESPIRATION RATE: 18 BRPM | BODY MASS INDEX: 27.64 KG/M2 | SYSTOLIC BLOOD PRESSURE: 142 MMHG

## 2023-08-28 DIAGNOSIS — E11.22 TYPE 2 DIABETES MELLITUS WITH STAGE 2 CHRONIC KIDNEY DISEASE, WITHOUT LONG-TERM CURRENT USE OF INSULIN (HCC): ICD-10-CM

## 2023-08-28 DIAGNOSIS — E78.49 OTHER HYPERLIPIDEMIA: ICD-10-CM

## 2023-08-28 DIAGNOSIS — N18.2 TYPE 2 DIABETES MELLITUS WITH STAGE 2 CHRONIC KIDNEY DISEASE, WITHOUT LONG-TERM CURRENT USE OF INSULIN (HCC): ICD-10-CM

## 2023-08-28 DIAGNOSIS — Z12.11 COLON CANCER SCREENING: ICD-10-CM

## 2023-08-28 DIAGNOSIS — E78.5 DYSLIPIDEMIA: ICD-10-CM

## 2023-08-28 DIAGNOSIS — N18.2 CKD STAGE 2 DUE TO TYPE 2 DIABETES MELLITUS (HCC): ICD-10-CM

## 2023-08-28 DIAGNOSIS — E11.22 CKD STAGE 2 DUE TO TYPE 2 DIABETES MELLITUS (HCC): ICD-10-CM

## 2023-08-28 DIAGNOSIS — N52.9 ERECTILE DYSFUNCTION, UNSPECIFIED ERECTILE DYSFUNCTION TYPE: ICD-10-CM

## 2023-08-28 DIAGNOSIS — Z00.00 MEDICARE ANNUAL WELLNESS VISIT, SUBSEQUENT: Primary | ICD-10-CM

## 2023-08-28 PROCEDURE — G0439 PPPS, SUBSEQ VISIT: HCPCS | Performed by: FAMILY MEDICINE

## 2023-08-28 PROCEDURE — 99214 OFFICE O/P EST MOD 30 MIN: CPT | Performed by: FAMILY MEDICINE

## 2023-08-28 RX ORDER — ATORVASTATIN CALCIUM 20 MG/1
20 TABLET, FILM COATED ORAL DAILY
Qty: 90 TABLET | Refills: 1 | Status: SHIPPED | OUTPATIENT
Start: 2023-08-28

## 2023-08-28 RX ORDER — METFORMIN HYDROCHLORIDE 500 MG/1
1500 TABLET, EXTENDED RELEASE ORAL
Qty: 270 TABLET | Refills: 1 | Status: SHIPPED | OUTPATIENT
Start: 2023-08-28

## 2023-08-28 NOTE — PROGRESS NOTES
Assessment/Plan:    No problem-specific Assessment & Plan notes found for this encounter. dm2 not controlled but improved  Increase metformin 1000mg/d to 1500mg/d if can tolerate  F/u 3m    HLD stable on lipitor 20mg/d    viagra rx prn    ckd2 stable, stay hydrated    Lab Results   Component Value Date    HGBA1C 7.2 (H) 07/31/2023    HGBA1C 10.8 (H) 12/27/2022    HGBA1C 7.4 (H) 04/05/2022     Lab Results   Component Value Date    GLUF 137 (H) 04/06/2022    LDLCALC 142 (H) 04/06/2022    CREATININE 1.08 07/31/2023          Diagnoses and all orders for this visit:    Medicare annual wellness visit, subsequent    Colon cancer screening  -     Ambulatory referral to Gastroenterology; Future    Type 2 diabetes mellitus with stage 2 chronic kidney disease, without long-term current use of insulin (HCC)  -     Comprehensive metabolic panel; Future  -     Hemoglobin A1C; Future  -     metFORMIN (GLUCOPHAGE-XR) 500 mg 24 hr tablet; Take 3 tablets (1,500 mg total) by mouth daily with dinner    Dyslipidemia  -     atorvastatin (LIPITOR) 20 mg tablet; Take 1 tablet (20 mg total) by mouth daily    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 Subjective:      Patient ID: Ivor Mohs is a 67 y.o. male. Chief Complaint   Patient presents with   • Follow-up      CMA        HPI  smbg 90s-120s  Diabetic diet followed  Daily exercise   Labs noted    The following portions of the patient's history were reviewed and updated as appropriate: allergies, current medications, past family history, past medical history, past social history, past surgical history and problem list.    Review of Systems   Constitutional: Negative for chills and fever.          Current Outpatient Medications   Medication Sig Dispense Refill   • atorvastatin (LIPITOR) 20 mg tablet Take 1 tablet (20 mg total) by mouth daily 90 tablet 1   • esomeprazole (NexIUM) 20 mg capsule Take 20 mg by mouth every morning before breakfast     • metFORMIN (GLUCOPHAGE-XR) 500 mg 24 hr tablet Take 3 tablets (1,500 mg total) by mouth daily with dinner 270 tablet 1   • sildenafil (VIAGRA) 100 mg tablet Take 1 tablet (100 mg total) by mouth daily as needed for erectile dysfunction 30 tablet 5     No current facility-administered medications for this visit. Objective:    /72   Pulse 98   Temp (!) 97.2 °F (36.2 °C)   Resp 18   Ht 5' 6" (1.676 m)   Wt 78 kg (172 lb)   BMI 27.76 kg/m²        Physical Exam  Vitals and nursing note reviewed. Constitutional:       Appearance: He is well-developed. HENT:      Head: Normocephalic. Eyes:      Conjunctiva/sclera: Conjunctivae normal.   Cardiovascular:      Rate and Rhythm: Normal rate. Pulmonary:      Effort: Pulmonary effort is normal. No respiratory distress. Abdominal:      Palpations: Abdomen is soft. Musculoskeletal:         General: No deformity. Cervical back: Neck supple. Right lower leg: No edema. Left lower leg: No edema. Skin:     General: Skin is warm and dry. Coloration: Skin is not pale. Neurological:      Mental Status: He is alert. Motor: No weakness. Gait: Gait normal.   Psychiatric:         Behavior: Behavior normal.         Thought Content:  Thought content normal.                 Earnest Zaragoza DO

## 2023-08-28 NOTE — PATIENT INSTRUCTIONS
Please increase the metformin 500mg from 2 per day to 3 per day to get your a1c under 7 in 3 months. Medicare Preventive Visit Patient Instructions  Thank you for completing your Welcome to Medicare Visit or Medicare Annual Wellness Visit today. Your next wellness visit will be due in one year (8/28/2024). The screening/preventive services that you may require over the next 5-10 years are detailed below. Some tests may not apply to you based off risk factors and/or age. Screening tests ordered at today's visit but not completed yet may show as past due. Also, please note that scanned in results may not display below. Preventive Screenings:  Service Recommendations Previous Testing/Comments   Colorectal Cancer Screening  · Colonoscopy    · Fecal Occult Blood Test (FOBT)/Fecal Immunochemical Test (FIT)  · Fecal DNA/Cologuard Test  · Flexible Sigmoidoscopy Age: 43-73 years old   Colonoscopy: every 10 years (May be performed more frequently if at higher risk)  OR  FOBT/FIT: every 1 year  OR  Cologuard: every 3 years  OR  Sigmoidoscopy: every 5 years  Screening may be recommended earlier than age 39 if at higher risk for colorectal cancer. Also, an individualized decision between you and your healthcare provider will decide whether screening between the ages of 77-80 would be appropriate.  Colonoscopy: 05/25/2022  FOBT/FIT: Not on file  Cologuard: Not on file  Sigmoidoscopy: Not on file          Prostate Cancer Screening Individualized decision between patient and health care provider in men between ages of 53-66   Medicare will cover every 12 months beginning on the day after your 50th birthday PSA: 4.10 ng/mL           Hepatitis C Screening Once for adults born between 1945 and 1965  More frequently in patients at high risk for Hepatitis C Hep C Antibody: Not on file        Diabetes Screening 1-2 times per year if you're at risk for diabetes or have pre-diabetes Fasting glucose: 137 mg/dL (4/6/2022)  A1C: 7.2 % of total Hgb (7/31/2023)      Cholesterol Screening Once every 5 years if you don't have a lipid disorder. May order more often based on risk factors. Lipid panel: 04/06/2022         Other Preventive Screenings Covered by Medicare:  1. Abdominal Aortic Aneurysm (AAA) Screening: covered once if your at risk. You're considered to be at risk if you have a family history of AAA or a male between the age of 70-76 who smoking at least 100 cigarettes in your lifetime. 2. Lung Cancer Screening: covers low dose CT scan once per year if you meet all of the following conditions: (1) Age 48-67; (2) No signs or symptoms of lung cancer; (3) Current smoker or have quit smoking within the last 15 years; (4) You have a tobacco smoking history of at least 20 pack years (packs per day x number of years you smoked); (5) You get a written order from a healthcare provider. 3. Glaucoma Screening: covered annually if you're considered high risk: (1) You have diabetes OR (2) Family history of glaucoma OR (3)  aged 48 and older OR (3)  American aged 72 and older  3. Osteoporosis Screening: covered every 2 years if you meet one of the following conditions: (1) Have a vertebral abnormality; (2) On glucocorticoid therapy for more than 3 months; (3) Have primary hyperparathyroidism; (4) On osteoporosis medications and need to assess response to drug therapy. 5. HIV Screening: covered annually if you're between the age of 14-79. Also covered annually if you are younger than 13 and older than 72 with risk factors for HIV infection. For pregnant patients, it is covered up to 3 times per pregnancy.     Immunizations:  Immunization Recommendations   Influenza Vaccine Annual influenza vaccination during flu season is recommended for all persons aged >= 6 months who do not have contraindications   Pneumococcal Vaccine   * Pneumococcal conjugate vaccine = PCV13 (Prevnar 13), PCV15 (Vaxneuvance), PCV20 (Prevnar 20)  * Pneumococcal polysaccharide vaccine = PPSV23 (Pneumovax) Adults 2364 years old: 1-3 doses may be recommended based on certain risk factors  Adults 72 years old: 1-2 doses may be recommended based off what pneumonia vaccine you previously received   Hepatitis B Vaccine 3 dose series if at intermediate or high risk (ex: diabetes, end stage renal disease, liver disease)   Tetanus (Td) Vaccine - COST NOT COVERED BY MEDICARE PART B Following completion of primary series, a booster dose should be given every 10 years to maintain immunity against tetanus. Td may also be given as tetanus wound prophylaxis. Tdap Vaccine - COST NOT COVERED BY MEDICARE PART B Recommended at least once for all adults. For pregnant patients, recommended with each pregnancy. Shingles Vaccine (Shingrix) - COST NOT COVERED BY MEDICARE PART B  2 shot series recommended in those aged 48 and above     Health Maintenance Due:      Topic Date Due   • Colorectal Cancer Screening  05/25/2023   • Hepatitis C Screening  05/06/2035 (Originally 1950)     Immunizations Due:      Topic Date Due   • COVID-19 Vaccine (3 - Moderna series) 12/10/2021   • Influenza Vaccine (1) 09/01/2023     Advance Directives   What are advance directives? Advance directives are legal documents that state your wishes and plans for medical care. These plans are made ahead of time in case you lose your ability to make decisions for yourself. Advance directives can apply to any medical decision, such as the treatments you want, and if you want to donate organs. What are the types of advance directives? There are many types of advance directives, and each state has rules about how to use them. You may choose a combination of any of the following:  · Living will: This is a written record of the treatment you want. You can also choose which treatments you do not want, which to limit, and which to stop at a certain time. This includes surgery, medicine, IV fluid, and tube feedings. · Durable power of  for [facility]): This is a written record that states who you want to make healthcare choices for you when you are unable to make them for yourself. This person, called a proxy, is usually a family member or a friend. You may choose more than 1 proxy. · Do not resuscitate (DNR) order:  A DNR order is used in case your heart stops beating or you stop breathing. It is a request not to have certain forms of treatment, such as CPR. A DNR order may be included in other types of advance directives. · Medical directive: This covers the care that you want if you are in a coma, near death, or unable to make decisions for yourself. You can list the treatments you want for each condition. Treatment may include pain medicine, surgery, blood transfusions, dialysis, IV or tube feedings, and a ventilator (breathing machine). · Values history: This document has questions about your views, beliefs, and how you feel and think about life. This information can help others choose the care that you would choose. Why are advance directives important? An advance directive helps you control your care. Although spoken wishes may be used, it is better to have your wishes written down. Spoken wishes can be misunderstood, or not followed. Treatments may be given even if you do not want them. An advance directive may make it easier for your family to make difficult choices about your care. Weight Management   Why it is important to manage your weight:  Being overweight increases your risk of health conditions such as heart disease, high blood pressure, type 2 diabetes, and certain types of cancer. It can also increase your risk for osteoarthritis, sleep apnea, and other respiratory problems. Aim for a slow, steady weight loss. Even a small amount of weight loss can lower your risk of health problems.   How to lose weight safely:  A safe and healthy way to lose weight is to eat fewer calories and get regular exercise. You can lose up about 1 pound a week by decreasing the number of calories you eat by 500 calories each day. Healthy meal plan for weight management:  A healthy meal plan includes a variety of foods, contains fewer calories, and helps you stay healthy. A healthy meal plan includes the following:  · Eat whole-grain foods more often. A healthy meal plan should contain fiber. Fiber is the part of grains, fruits, and vegetables that is not broken down by your body. Whole-grain foods are healthy and provide extra fiber in your diet. Some examples of whole-grain foods are whole-wheat breads and pastas, oatmeal, brown rice, and bulgur. · Eat a variety of vegetables every day. Include dark, leafy greens such as spinach, kale, nikky greens, and mustard greens. Eat yellow and orange vegetables such as carrots, sweet potatoes, and winter squash. · Eat a variety of fruits every day. Choose fresh or canned fruit (canned in its own juice or light syrup) instead of juice. Fruit juice has very little or no fiber. · Eat low-fat dairy foods. Drink fat-free (skim) milk or 1% milk. Eat fat-free yogurt and low-fat cottage cheese. Try low-fat cheeses such as mozzarella and other reduced-fat cheeses. · Choose meat and other protein foods that are low in fat. Choose beans or other legumes such as split peas or lentils. Choose fish, skinless poultry (chicken or turkey), or lean cuts of red meat (beef or pork). Before you cook meat or poultry, cut off any visible fat. · Use less fat and oil. Try baking foods instead of frying them. Add less fat, such as margarine, sour cream, regular salad dressing and mayonnaise to foods. Eat fewer high-fat foods. Some examples of high-fat foods include french fries, doughnuts, ice cream, and cakes. · Eat fewer sweets. Limit foods and drinks that are high in sugar. This includes candy, cookies, regular soda, and sweetened drinks.   Exercise:  Exercise at least 30 minutes per day on most days of the week. Some examples of exercise include walking, biking, dancing, and swimming. You can also fit in more physical activity by taking the stairs instead of the elevator or parking farther away from stores. Ask your healthcare provider about the best exercise plan for you. © Copyright [employer] 2018 Information is for End User's use only and may not be sold, redistributed or otherwise used for commercial purposes.  All illustrations and images included in CareNotes® are the copyrighted property of [employer]M., Inc. or  [employer]

## 2023-08-29 NOTE — PROGRESS NOTES
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 Personalized health advice and appropriate referrals for health education or preventive services given if needed, as noted in patient's After Visit Summary.      History of Present Illness:     Patient presents for a Medicare Wellness Visit    HPI   Patient Care Team:  Ming Manuel DO as PCP - General (Family Medicine)     Review of Systems:     Review of Systems     Problem List:     Patient Active Problem List   Diagnosis   • Primary osteoarthritis of right shoulder   • Other hyperlipidemia   • ED (erectile dysfunction)   • GERD (gastroesophageal reflux disease)   • Hyperpigmentation of skin   • Seasonal allergic rhinitis   • [place] cardiac risk 10-20% in next 10 years   • Fingertip eczema   • Vertigo   • Hiatal hernia   • Personal history of colonic polyps   • Type 2 diabetes mellitus with stage 2 chronic kidney disease, without long-term current use of insulin ([address])   • CKD stage 2 due to type 2 diabetes mellitus ([address])   • Elevated PSA   • Medicare annual wellness visit, subsequent   • Colon cancer screening      Past Medical and Surgical History:     Past Medical History:   Diagnosis Date   • GERD (gastroesophageal reflux disease)    • Hiatal hernia    • Presence of upper and lower permanent dental bridges     upper/lower   • Stroke-like symptom 04/05/2022   • Vertigo    • Wears glasses     for reading     Past Surgical History:   Procedure Laterality Date   • COLONOSCOPY        Family History:     History reviewed. No pertinent family history. Social History:     Social History     Socioeconomic History   • Marital status: /Civil Union     Spouse name: None   • Number of children: None   • Years of education: None   • Highest education level: None   Occupational History   • None   Tobacco Use   • Smoking status: Never   • Smokeless tobacco: Never   Substance and Sexual Activity   • Alcohol use: Not Currently   • Drug use: Never   • Sexual activity: None   Other Topics Concern   • None   Social History Narrative   • None     Social Determinants of Health     Financial Resource Strain: Not on file   Food Insecurity: No Food Insecurity (4/6/2022)    Hunger Vital Sign    • Worried About Running Out of Food in the Last Year: Never true    • Ran Out of Food in the Last Year: Never true   Transportation Needs: No Transportation Needs (8/28/2023)    PRAPARE - Transportation    • Lack of Transportation (Medical): No    • Lack of Transportation (Non-Medical):  No   Physical Activity: Not on file   Stress: Not on file   Social Connections: Not on file   Intimate Partner Violence: Not on file   Housing Stability: Low Risk  (4/6/2022)    Housing Stability Vital Sign    • Unable to Pay for Housing in the Last Year: No    • Number of Places Lived in the Last Year: 1    • Unstable Housing in the Last Year: No      Medications and Allergies:     Current Outpatient Medications   Medication Sig Dispense Refill   • atorvastatin (LIPITOR) 20 mg tablet Take 1 tablet (20 mg total) by mouth daily 90 tablet 1   • esomeprazole (NexIUM) 20 mg capsule Take 20 mg by mouth every morning before breakfast     • metFORMIN (GLUCOPHAGE-XR) 500 mg 24 hr tablet Take 3 tablets (1,500 mg total) by mouth daily with dinner 270 tablet 1   • sildenafil (VIAGRA) 100 mg tablet Take 1 tablet (100 mg total) by mouth daily as needed for erectile dysfunction 30 tablet 5     No current facility-administered medications for this visit. No Known Allergies   Immunizations:     Immunization History   Administered Date(s) Administered   • COVID-19 MODERNA VACC 0.5 ML IM 08/09/2021, 10/15/2021   • INFLUENZA 08/22/2012, 08/24/2017, 09/08/2018   • Influenza Split High Dose Preservative Free IM 09/05/2016   • Pneumococcal Conjugate 13-Valent 02/15/2016   • Pneumococcal Polysaccharide PPV23 06/23/2017   • Tdap 03/15/2011      Health Maintenance:         Topic Date Due   • Colorectal Cancer Screening  05/25/2023   • Hepatitis C Screening  05/06/2035 (Originally 1950)         Topic Date Due   • COVID-19 Vaccine (3 - Moderna series) 12/10/2021   • Influenza Vaccine (1) 09/01/2023      Medicare Screening Tests and Risk Assessments:     Rodriguez Waite is here for his Subsequent Wellness visit. Last Medicare Wellness visit information reviewed, patient interviewed and updates made to the record today. Health Risk Assessment:   Patient rates overall health as good. Patient feels that their physical health rating is same. Patient is satisfied with their life. Eyesight was rated as same. Hearing was rated as same. Patient feels that their emotional and mental health rating is same. Patients states they are never, rarely angry. Patient states they are sometimes unusually tired/fatigued. Pain experienced in the last 7 days has been some. Patient's pain rating has been 6/10. Patient states that he has experienced no weight loss or gain in last 6 months. Fall Risk Screening:    In the past year, patient has experienced: no history of falling in past year      Home Safety:  Patient does not have trouble with stairs inside or outside of their home. Patient has working smoke alarms and has no working carbon monoxide detector. Home safety hazards include: none. Nutrition:   Current diet is Diabetic, Low Carb, Low Saturated Fat and Limited junk food. Medications:   Patient is not currently taking any over-the-counter supplements. Patient is able to manage medications. Activities of Daily Living (ADLs)/Instrumental Activities of Daily Living (IADLs):   Walk and transfer into and out of bed and chair?: Yes  Dress and groom yourself?: Yes    Bathe or shower yourself?: Yes    Feed yourself?  Yes  Do your laundry/housekeeping?: Yes  Manage your money, pay your bills and track your expenses?: Yes  Make your own meals?: Yes    Do your own shopping?: Yes    Previous Hospitalizations:   Any hospitalizations or ED visits within the last 12 months?: Yes    How many hospitalizations have you had in the last year?: 1-2    Advance Care Planning:   Living will: No    Durable POA for healthcare: No    End of Life Decisions reviewed with patient: No      Cognitive Screening:   Provider or family/friend/caregiver concerned regarding cognition?: No    PREVENTIVE SCREENINGS      Cardiovascular Screening:    General: Screening Not Indicated and History Lipid Disorder      Diabetes Screening:     General: Screening Not Indicated and History Diabetes      Colorectal Cancer Screening:     General: Screening Current      Prostate Cancer Screening:    General: Screening Current      Osteoporosis Screening:    General: Screening Not Indicated      Abdominal Aortic Aneurysm (AAA) Screening:    Risk factors include: age between 70-75 yo        Lung Cancer Screening:     General: Screening Not Indicated      Hepatitis C Screening:    General: Screening Current    Screening, Brief Intervention, and Referral to Treatment (SBIRT)    Screening  Typical number of drinks in a day: 0  Typical number of drinks in a week: 0  Interpretation: Low risk drinking behavior. Single Item Drug Screening:  How often have you used an illegal drug (including marijuana) or a prescription medication for non-medical reasons in the past year? never    Single Item Drug Screen Score: 0  Interpretation: Negative screen for possible drug use disorder    Other Counseling Topics:   Car/seat belt/driving safety and regular weightbearing exercise. No results found.      Physical Exam:     /72   Pulse 98   Temp (!) 97.2 °F (36.2 °C)   Resp 18   Ht 5' 6" (1.676 m)   Wt 78 kg (172 lb)   BMI 27.76 kg/m²     Physical Exam     Amie Lindsay, DO

## 2023-10-27 PROBLEM — Z00.00 MEDICARE ANNUAL WELLNESS VISIT, SUBSEQUENT: Status: RESOLVED | Noted: 2023-08-28 | Resolved: 2023-10-27

## 2023-10-27 PROBLEM — Z12.11 COLON CANCER SCREENING: Status: RESOLVED | Noted: 2023-08-28 | Resolved: 2023-10-27

## 2024-01-12 LAB
LEFT EYE DIABETIC RETINOPATHY: NORMAL
RIGHT EYE DIABETIC RETINOPATHY: NORMAL
SEVERITY (EYE EXAM): NORMAL

## 2024-01-23 ENCOUNTER — OFFICE VISIT (OUTPATIENT)
Dept: FAMILY MEDICINE CLINIC | Facility: CLINIC | Age: 74
End: 2024-01-23
Payer: MEDICARE

## 2024-01-23 VITALS
TEMPERATURE: 97.8 F | HEIGHT: 66 IN | RESPIRATION RATE: 18 BRPM | WEIGHT: 167 LBS | OXYGEN SATURATION: 97 % | DIASTOLIC BLOOD PRESSURE: 70 MMHG | HEART RATE: 82 BPM | SYSTOLIC BLOOD PRESSURE: 130 MMHG | BODY MASS INDEX: 26.84 KG/M2

## 2024-01-23 DIAGNOSIS — N18.2 CKD STAGE 2 DUE TO TYPE 2 DIABETES MELLITUS: ICD-10-CM

## 2024-01-23 DIAGNOSIS — N18.2 TYPE 2 DIABETES MELLITUS WITH STAGE 2 CHRONIC KIDNEY DISEASE, WITHOUT LONG-TERM CURRENT USE OF INSULIN: Primary | ICD-10-CM

## 2024-01-23 DIAGNOSIS — E11.22 TYPE 2 DIABETES MELLITUS WITH STAGE 2 CHRONIC KIDNEY DISEASE, WITHOUT LONG-TERM CURRENT USE OF INSULIN: Primary | ICD-10-CM

## 2024-01-23 DIAGNOSIS — E78.49 OTHER HYPERLIPIDEMIA: ICD-10-CM

## 2024-01-23 DIAGNOSIS — E11.22 CKD STAGE 2 DUE TO TYPE 2 DIABETES MELLITUS: ICD-10-CM

## 2024-01-23 DIAGNOSIS — N52.9 ERECTILE DYSFUNCTION, UNSPECIFIED ERECTILE DYSFUNCTION TYPE: ICD-10-CM

## 2024-01-23 DIAGNOSIS — H11.002 PTERYGIUM EYE, LEFT: ICD-10-CM

## 2024-01-23 DIAGNOSIS — Z12.11 COLON CANCER SCREENING: ICD-10-CM

## 2024-01-23 LAB — SL AMB POCT HEMOGLOBIN AIC: 8.2 (ref ?–6.5)

## 2024-01-23 PROCEDURE — 83036 HEMOGLOBIN GLYCOSYLATED A1C: CPT | Performed by: FAMILY MEDICINE

## 2024-01-23 PROCEDURE — 99214 OFFICE O/P EST MOD 30 MIN: CPT | Performed by: FAMILY MEDICINE

## 2024-01-23 RX ORDER — ATORVASTATIN CALCIUM 20 MG/1
20 TABLET, FILM COATED ORAL DAILY
Qty: 90 TABLET | Refills: 1 | Status: SHIPPED | OUTPATIENT
Start: 2024-01-23

## 2024-01-23 RX ORDER — METFORMIN HYDROCHLORIDE 500 MG/1
2000 TABLET, EXTENDED RELEASE ORAL
Qty: 360 TABLET | Refills: 1 | Status: SHIPPED | OUTPATIENT
Start: 2024-01-23

## 2024-01-23 RX ORDER — VARDENAFIL HYDROCHLORIDE 20 MG/1
20 TABLET ORAL DAILY PRN
Qty: 30 TABLET | Refills: 5 | Status: SHIPPED | OUTPATIENT
Start: 2024-01-23

## 2024-01-23 NOTE — PATIENT INSTRUCTIONS
A1C level indicates Diabetes is not well controlled.  Today it is 8.2  Goal every 3 months is less than 7.0  In addition to your efforts of diabetic diet and exercise, please increase the metformin from 1500 mg per day to the maximum of 2000 mg per day and if this does not get your A1c under 7 in 3 months, then we will need to add on more medications.    Other pill options may include  Jimmy Robles    Today you were given a new lab slip (throw away the old one) to do in 12 weeks time before your next visit in 13 weeks.

## 2024-01-23 NOTE — PROGRESS NOTES
Assessment/Plan:    No problem-specific Assessment & Plan notes found for this encounter.    Uncontrolled DM2  A1c 8.2 by office POC  Q3m f/u and labs and goals advised    Increase metformin 1500mg/d to 2000mg/d  F/u 3m  Add on plan discussed    Ckd2, stay hydrated  If gets to ckd3, then will need to reduce metformin dose for safety    Left eye pterygium  F/u ophtho    HLD stable on lipitor  Await labs    A1C level indicates Diabetes is not well controlled.  Today it is 8.2  Goal every 3 months is less than 7.0  In addition to your efforts of diabetic diet and exercise, please increase the metformin from 1500 mg per day to the maximum of 2000 mg per day and if this does not get your A1c under 7 in 3 months, then we will need to add on more medications.    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    Subjective:      Patient ID: Toan Wong is a 73 y.o. male.    Chief Complaint   Patient presents with    Follow-up     3 months  [id number]       HPI  He did not do the labs as ordered  Taking his meds  Denies gi side effects  A1c goal  "reminded    Viagra not effective  Wants to try levitra    The following portions of the patient's history were reviewed and updated as appropriate: allergies, current medications, past family history, past medical history, past social history, past surgical history and problem list.    Review of Systems   Constitutional:  Negative for chills and fever.   Respiratory:  Negative for shortness of breath.    Cardiovascular:  Negative for chest pain.         Current Outpatient Medications   Medication Sig Dispense Refill    atorvastatin (LIPITOR) 20 mg tablet Take 1 tablet (20 mg total) by mouth daily 90 tablet 1    esomeprazole (NexIUM) 20 mg capsule Take 20 mg by mouth every morning before breakfast      metFORMIN (GLUCOPHAGE-XR) 500 mg 24 hr tablet Take 4 tablets (2,000 mg total) by mouth daily with dinner 360 tablet 1    vardenafil (LEVITRA) 20 MG tablet Take 1 tablet (20 mg total) by mouth daily as needed for erectile dysfunction 30 tablet 5     No current facility-administered medications for this visit.       Objective:    /70   Pulse 82   Temp 97.8 °F (36.6 °C)   Resp 18   Ht 5' 6\" (1.676 m)   Wt 75.8 kg (167 lb)   SpO2 97%   BMI 26.95 kg/m²        Physical Exam  Vitals and nursing note reviewed.   Constitutional:       General: He is not in acute distress.     Appearance: He is well-developed. He is not ill-appearing.   HENT:      Head: Normocephalic.      Right Ear: Tympanic membrane normal.      Left Ear: Tympanic membrane normal.      Nose: No congestion.   Eyes:      General: No scleral icterus.     Conjunctiva/sclera: Conjunctivae normal.   Neck:      Vascular: No carotid bruit.   Cardiovascular:      Rate and Rhythm: Normal rate and regular rhythm.      Heart sounds: No murmur heard.  Pulmonary:      Effort: Pulmonary effort is normal. No respiratory distress.      Breath sounds: No wheezing.   Abdominal:      General: There is no distension.      Palpations: Abdomen is soft.      Tenderness: " There is no abdominal tenderness.   Musculoskeletal:         General: No deformity.      Cervical back: Neck supple.   Skin:     General: Skin is warm and dry.      Coloration: Skin is not pale.   Neurological:      Mental Status: He is alert.      Motor: No weakness.      Gait: Gait normal.   Psychiatric:         Mood and Affect: Mood normal.         Behavior: Behavior normal.         Thought Content: Thought content normal.                Porfirio Law,

## 2024-02-01 ENCOUNTER — VBI (OUTPATIENT)
Dept: ADMINISTRATIVE | Facility: OTHER | Age: 74
End: 2024-02-01

## 2024-02-01 NOTE — TELEPHONE ENCOUNTER
Upon review of the In Basket request we were able to locate, review, and update the patient chart as requested for Diabetic Eye Exam.    Any additional questions or concerns should be emailed to the Practice Liaisons via the appropriate education email address, please do not reply via In Basket.    Thank you  Neli Alexander

## 2024-02-24 DIAGNOSIS — N52.9 ERECTILE DYSFUNCTION, UNSPECIFIED ERECTILE DYSFUNCTION TYPE: ICD-10-CM

## 2024-02-26 DIAGNOSIS — N18.2 TYPE 2 DIABETES MELLITUS WITH STAGE 2 CHRONIC KIDNEY DISEASE, WITHOUT LONG-TERM CURRENT USE OF INSULIN: ICD-10-CM

## 2024-02-26 DIAGNOSIS — E78.49 OTHER HYPERLIPIDEMIA: ICD-10-CM

## 2024-02-26 DIAGNOSIS — E11.22 TYPE 2 DIABETES MELLITUS WITH STAGE 2 CHRONIC KIDNEY DISEASE, WITHOUT LONG-TERM CURRENT USE OF INSULIN: ICD-10-CM

## 2024-02-26 RX ORDER — ATORVASTATIN CALCIUM 20 MG/1
20 TABLET, FILM COATED ORAL DAILY
Qty: 30 TABLET | Refills: 0 | Status: SHIPPED | OUTPATIENT
Start: 2024-02-26

## 2024-02-26 RX ORDER — METFORMIN HYDROCHLORIDE 500 MG/1
TABLET, EXTENDED RELEASE ORAL
Qty: 270 TABLET | Refills: 1 | Status: SHIPPED | OUTPATIENT
Start: 2024-02-26

## 2024-02-27 RX ORDER — SILDENAFIL 100 MG/1
TABLET, FILM COATED ORAL
Qty: 30 TABLET | Refills: 5 | Status: SHIPPED | OUTPATIENT
Start: 2024-02-27 | End: 2024-02-29 | Stop reason: SDUPTHER

## 2024-02-28 ENCOUNTER — TELEPHONE (OUTPATIENT)
Dept: FAMILY MEDICINE CLINIC | Facility: CLINIC | Age: 74
End: 2024-02-28

## 2024-02-29 DIAGNOSIS — N52.9 ERECTILE DYSFUNCTION, UNSPECIFIED ERECTILE DYSFUNCTION TYPE: ICD-10-CM

## 2024-02-29 RX ORDER — SILDENAFIL 100 MG/1
100 TABLET, FILM COATED ORAL AS NEEDED
Qty: 10 TABLET | Refills: 5 | Status: SHIPPED | OUTPATIENT
Start: 2024-02-29

## 2024-05-14 ENCOUNTER — OFFICE VISIT (OUTPATIENT)
Dept: FAMILY MEDICINE CLINIC | Facility: CLINIC | Age: 74
End: 2024-05-14
Payer: MEDICARE

## 2024-05-14 VITALS
RESPIRATION RATE: 18 BRPM | HEART RATE: 90 BPM | DIASTOLIC BLOOD PRESSURE: 70 MMHG | SYSTOLIC BLOOD PRESSURE: 124 MMHG | TEMPERATURE: 98.1 F | WEIGHT: 167 LBS | BODY MASS INDEX: 26.95 KG/M2

## 2024-05-14 DIAGNOSIS — Z12.11 COLON CANCER SCREENING: ICD-10-CM

## 2024-05-14 DIAGNOSIS — E78.49 OTHER HYPERLIPIDEMIA: ICD-10-CM

## 2024-05-14 DIAGNOSIS — N18.2 CKD STAGE 2 DUE TO TYPE 2 DIABETES MELLITUS  (HCC): ICD-10-CM

## 2024-05-14 DIAGNOSIS — E11.22 CKD STAGE 2 DUE TO TYPE 2 DIABETES MELLITUS  (HCC): ICD-10-CM

## 2024-05-14 DIAGNOSIS — E11.22 TYPE 2 DIABETES MELLITUS WITH STAGE 2 CHRONIC KIDNEY DISEASE, WITHOUT LONG-TERM CURRENT USE OF INSULIN  (HCC): ICD-10-CM

## 2024-05-14 DIAGNOSIS — B02.29 PHN (POSTHERPETIC NEURALGIA): Primary | ICD-10-CM

## 2024-05-14 DIAGNOSIS — N18.2 TYPE 2 DIABETES MELLITUS WITH STAGE 2 CHRONIC KIDNEY DISEASE, WITHOUT LONG-TERM CURRENT USE OF INSULIN  (HCC): ICD-10-CM

## 2024-05-14 PROCEDURE — 99214 OFFICE O/P EST MOD 30 MIN: CPT | Performed by: FAMILY MEDICINE

## 2024-05-14 PROCEDURE — G2211 COMPLEX E/M VISIT ADD ON: HCPCS | Performed by: FAMILY MEDICINE

## 2024-05-14 RX ORDER — LIDOCAINE 50 MG/G
1 PATCH TOPICAL DAILY
Qty: 30 PATCH | Refills: 2 | Status: SHIPPED | OUTPATIENT
Start: 2024-05-14 | End: 2024-05-20

## 2024-05-14 NOTE — PATIENT INSTRUCTIONS
For the shingles pain, we can start with a 12 hour pain patch called lidoderm.  It is placed on the area for 12 hours a day and removed for 12 hours.  If you don't find benefit with this, then you can call to start a pill called gabapentin 100mg at bedtime and we may need to gradually increase the dose on a weekly basis to get to the dose that helps. I would see you in the office about every 2 weeks.

## 2024-05-14 NOTE — PROGRESS NOTES
Assessment/Plan:    No problem-specific Assessment & Plan notes found for this encounter.    PHN  Right flank  Start with lidoderm  Discussed start/titration of gabapentin in future if interested  Timing of shingrix advised    Uncontrolled DM2  Complications of DM2 advised  Advised q3m f/u needed, has lab slip, did not do it as advised    HLD   Continue statin for risk reduction    For the shingles pain, we can start with a 12 hour pain patch called lidoderm.  It is placed on the area for 12 hours a day and removed for 12 hours.  If you don't find benefit with this, then you can call to start a pill called gabapentin 100mg at bedtime and we may need to gradually increase the dose on a weekly basis to get to the dose that helps. I would see you in the office about every 2 weeks.       Diagnoses and all orders for this visit:    PHN (postherpetic neuralgia)  -     lidocaine (Lidoderm) 5 %; Apply 1 patch topically over 12 hours daily Remove & Discard patch within 12 hours or as directed by MD    Colon cancer screening  -     Ambulatory referral to Gastroenterology; Future    Type 2 diabetes mellitus with stage 2 chronic kidney disease, without long-term current use of insulin  (HCC)    CKD stage 2 due to type 2 diabetes mellitus  (HCC)    Other hyperlipidemia        No follow-ups on file.    Subjective:      Patient ID: Toan Wong is a 73 y.o. male.    Chief Complaint   Patient presents with    Pain     Shingles pain               sas/cma       HPI  Aware of q3m DM f/u  Last A1c was 8.2 jan 1/23/24    Overdue for DM monitoring   Dm complications advised    Dx with zoster on 2/12/24  Tx with valtrex 1g tid for 7d  Had shingrix #1 2m ago at pharmacy, about 1-2 weeks after the rash    Still sensitive  All day  To touch  Affects sleep    The following portions of the patient's history were reviewed and updated as appropriate: allergies, current medications, past family history, past medical history, past social  history, past surgical history and problem list.    Review of Systems   Constitutional:  Negative for chills and fever.         Current Outpatient Medications   Medication Sig Dispense Refill    atorvastatin (LIPITOR) 20 mg tablet TAKE ONE TABLET BY MOUTH EVERY DAY 30 tablet 0    esomeprazole (NexIUM) 20 mg capsule Take 20 mg by mouth every morning before breakfast      lidocaine (Lidoderm) 5 % Apply 1 patch topically over 12 hours daily Remove & Discard patch within 12 hours or as directed by MD 30 patch 2    metFORMIN (GLUCOPHAGE-XR) 500 mg 24 hr tablet TAKE THREE TABLETS BY MOUTH EVERY DAY WITH DINNER (GENERIC FOR GLUCOPHAGE XR) 270 tablet 1    sildenafil (VIAGRA) 100 mg tablet Take 1 tablet (100 mg total) by mouth as needed for erectile dysfunction Dispense BRAND VIAGRA (Patient not taking: Reported on 5/14/2024) 10 tablet 5     No current facility-administered medications for this visit.       Objective:    /70   Pulse 90   Temp 98.1 °F (36.7 °C)   Resp 18   Wt 75.8 kg (167 lb)   BMI 26.95 kg/m²        Physical Exam  Vitals and nursing note reviewed.   Constitutional:       General: He is not in acute distress.     Appearance: He is well-developed. He is not ill-appearing.   HENT:      Head: Normocephalic.      Right Ear: Tympanic membrane normal.      Left Ear: Tympanic membrane normal.   Eyes:      General: No scleral icterus.     Conjunctiva/sclera: Conjunctivae normal.   Cardiovascular:      Rate and Rhythm: Normal rate and regular rhythm.      Pulses: no weak pulses.           Dorsalis pedis pulses are 2+ on the right side and 2+ on the left side.      Heart sounds: No murmur heard.  Pulmonary:      Effort: Pulmonary effort is normal. No respiratory distress.      Breath sounds: No wheezing.   Abdominal:      General: There is no distension.      Palpations: Abdomen is soft.      Tenderness: There is no abdominal tenderness.   Musculoskeletal:         General: No deformity.      Cervical back:  Neck supple.   Skin:     General: Skin is warm and dry.      Findings: Rash present.      Comments: Right flank, no vesicles, some scarring   Neurological:      Mental Status: He is alert.      Motor: No weakness.      Gait: Gait normal.   Psychiatric:         Mood and Affect: Mood normal.         Behavior: Behavior normal.         Thought Content: Thought content normal.         Diabetic Foot Exam    Patient's shoes and socks removed.    Right Foot/Ankle   Right Foot Inspection  Skin Exam: skin intact. No abnormal color.     Sensory   Monofilament testing: intact    Vascular  The right DP pulse is 2+.     Left Foot/Ankle  Left Foot Inspection  Skin Exam: skin intact. Normal color.     Sensory   Monofilament testing: intact    Vascular  The left DP pulse is 2+.     Assign Risk Category  No deformity present  No loss of protective sensation  No weak pulses  Risk: 0         Porfirio Law,

## 2024-05-20 ENCOUNTER — TELEPHONE (OUTPATIENT)
Dept: FAMILY MEDICINE CLINIC | Facility: CLINIC | Age: 74
End: 2024-05-20

## 2024-05-20 DIAGNOSIS — B02.29 PHN (POSTHERPETIC NEURALGIA): Primary | ICD-10-CM

## 2024-05-20 RX ORDER — GABAPENTIN 100 MG/1
100 CAPSULE ORAL
Qty: 30 CAPSULE | Refills: 1 | Status: SHIPPED | OUTPATIENT
Start: 2024-05-20

## 2024-05-21 NOTE — TELEPHONE ENCOUNTER
Pt reports lidoderm not helping PHN  Sent gabapentin 100mg qhs  Advised f/u for titration at last visit

## 2024-05-24 LAB
ALBUMIN SERPL-MCNC: 4.2 G/DL (ref 3.6–5.1)
ALBUMIN/GLOB SERPL: 1.4 (CALC) (ref 1–2.5)
ALP SERPL-CCNC: 68 U/L (ref 35–144)
ALT SERPL-CCNC: 30 U/L (ref 9–46)
AST SERPL-CCNC: 22 U/L (ref 10–35)
BILIRUB SERPL-MCNC: 0.8 MG/DL (ref 0.2–1.2)
BUN SERPL-MCNC: 14 MG/DL (ref 7–25)
BUN/CREAT SERPL: ABNORMAL (CALC) (ref 6–22)
CALCIUM SERPL-MCNC: 9.2 MG/DL (ref 8.6–10.3)
CHLORIDE SERPL-SCNC: 104 MMOL/L (ref 98–110)
CO2 SERPL-SCNC: 31 MMOL/L (ref 20–32)
CREAT SERPL-MCNC: 1.03 MG/DL (ref 0.7–1.28)
GFR/BSA.PRED SERPLBLD CYS-BASED-ARV: 77 ML/MIN/1.73M2
GLOBULIN SER CALC-MCNC: 3 G/DL (CALC) (ref 1.9–3.7)
GLUCOSE SERPL-MCNC: 153 MG/DL (ref 65–99)
HBA1C MFR BLD: 8.6 % OF TOTAL HGB
POTASSIUM SERPL-SCNC: 4 MMOL/L (ref 3.5–5.3)
PROT SERPL-MCNC: 7.2 G/DL (ref 6.1–8.1)
SODIUM SERPL-SCNC: 143 MMOL/L (ref 135–146)

## 2024-06-11 ENCOUNTER — OFFICE VISIT (OUTPATIENT)
Dept: FAMILY MEDICINE CLINIC | Facility: CLINIC | Age: 74
End: 2024-06-11
Payer: MEDICARE

## 2024-06-11 VITALS
SYSTOLIC BLOOD PRESSURE: 134 MMHG | RESPIRATION RATE: 17 BRPM | DIASTOLIC BLOOD PRESSURE: 70 MMHG | TEMPERATURE: 97.1 F | BODY MASS INDEX: 26.65 KG/M2 | WEIGHT: 165.8 LBS | HEIGHT: 66 IN | HEART RATE: 78 BPM

## 2024-06-11 DIAGNOSIS — B02.29 PHN (POSTHERPETIC NEURALGIA): ICD-10-CM

## 2024-06-11 DIAGNOSIS — N18.2 CKD STAGE 2 DUE TO TYPE 2 DIABETES MELLITUS  (HCC): ICD-10-CM

## 2024-06-11 DIAGNOSIS — N18.2 TYPE 2 DIABETES MELLITUS WITH STAGE 2 CHRONIC KIDNEY DISEASE, WITHOUT LONG-TERM CURRENT USE OF INSULIN  (HCC): Primary | ICD-10-CM

## 2024-06-11 DIAGNOSIS — E11.22 CKD STAGE 2 DUE TO TYPE 2 DIABETES MELLITUS  (HCC): ICD-10-CM

## 2024-06-11 DIAGNOSIS — E11.22 TYPE 2 DIABETES MELLITUS WITH STAGE 2 CHRONIC KIDNEY DISEASE, WITHOUT LONG-TERM CURRENT USE OF INSULIN  (HCC): Primary | ICD-10-CM

## 2024-06-11 DIAGNOSIS — E78.49 OTHER HYPERLIPIDEMIA: ICD-10-CM

## 2024-06-11 PROCEDURE — 99214 OFFICE O/P EST MOD 30 MIN: CPT | Performed by: FAMILY MEDICINE

## 2024-06-11 PROCEDURE — G2211 COMPLEX E/M VISIT ADD ON: HCPCS | Performed by: FAMILY MEDICINE

## 2024-06-11 RX ORDER — GABAPENTIN 100 MG/1
300 CAPSULE ORAL
Qty: 90 CAPSULE | Refills: 5 | Status: SHIPPED | OUTPATIENT
Start: 2024-06-11

## 2024-06-11 RX ORDER — METFORMIN HYDROCHLORIDE 500 MG/1
2000 TABLET, EXTENDED RELEASE ORAL
Qty: 360 TABLET | Refills: 1 | Status: SHIPPED | OUTPATIENT
Start: 2024-06-11

## 2024-06-11 NOTE — PATIENT INSTRUCTIONS
Please try to take 4 pills of metformin every single day, any way you want.  If your A1c is over 7 in then 12 weeks, add-on options include:    Marlen Quintero    We can increase the gabapentin every week to see if we can control the shingles pain for you.  For now, take 200mg at bedtime even though the prescription bottle says to take 300mg.

## 2024-06-11 NOTE — PROGRESS NOTES
Assessment/Plan:    No problem-specific Assessment & Plan notes found for this encounter.    DM2 not at goal  Diet advised  Increase metformin to 2000mg/d  Add on options discussed if not at goal in 3m    PHN  Increase gabapentin 100mg qhs to 200mg qhs, call weekly to adjust dose as tolerated    HLD stable, continue statin    Ckd2  Stay hydrated    Please try to take 4 pills of metformin every single day, any way you want.  If your A1c is over 7 in then 12 weeks, add-on options include:    Marlen Quintero    We can increase the gabapentin every week to see if we can control the shingles pain for you.  For now, take 200mg at bedtime even though the prescription bottle says to take 300mg.     Diagnoses and all orders for this visit:    Type 2 diabetes mellitus with stage 2 chronic kidney disease, without long-term current use of insulin  (HCC)  -     Comprehensive metabolic panel; Future  -     Hemoglobin A1C; Future  -     Albumin / creatinine urine ratio; Future  -     metFORMIN (GLUCOPHAGE-XR) 500 mg 24 hr tablet; Take 4 tablets (2,000 mg total) by mouth daily with dinner    CKD stage 2 due to type 2 diabetes mellitus  (HCC)    PHN (postherpetic neuralgia)  -     gabapentin (NEURONTIN) 100 mg capsule; Take 3 capsules (300 mg total) by mouth daily at bedtime    Other hyperlipidemia  -     Lipid Panel with Direct LDL reflex; Future        Return in about 13 weeks (around 9/10/2024).    Subjective:      Patient ID: Toan Wong is a 73 y.o. male.    Chief Complaint   Patient presents with    Follow-up     3 month f/u Allison Sheikh LPN         HPI  Taking gabapentin 100mg qhs  Lidoderm not effective  Had shingrix 2 shots     and itchy at times    A1c 8.6  Does not always take 3rd pill of metformin as has been taking it tid    The following portions of the patient's history were reviewed and updated as appropriate:  "allergies, current medications, past family history, past medical history, past social history, past surgical history and problem list.    Review of Systems   Gastrointestinal:  Negative for diarrhea, nausea and vomiting.         Current Outpatient Medications   Medication Sig Dispense Refill    atorvastatin (LIPITOR) 20 mg tablet TAKE ONE TABLET BY MOUTH EVERY DAY 30 tablet 0    esomeprazole (NexIUM) 20 mg capsule Take 20 mg by mouth every morning before breakfast      gabapentin (NEURONTIN) 100 mg capsule Take 3 capsules (300 mg total) by mouth daily at bedtime 90 capsule 5    metFORMIN (GLUCOPHAGE-XR) 500 mg 24 hr tablet Take 4 tablets (2,000 mg total) by mouth daily with dinner 360 tablet 1    sildenafil (VIAGRA) 100 mg tablet Take 1 tablet (100 mg total) by mouth as needed for erectile dysfunction Dispense BRAND VIAGRA 10 tablet 5     No current facility-administered medications for this visit.       Objective:    /70   Pulse 78   Temp (!) 97.1 °F (36.2 °C)   Resp 17   Ht 5' 6\" (1.676 m)   Wt 75.2 kg (165 lb 12.8 oz)   BMI 26.76 kg/m²        Physical Exam  Vitals and nursing note reviewed.   Constitutional:       General: He is not in acute distress.     Appearance: He is well-developed. He is not ill-appearing.   HENT:      Head: Normocephalic.      Right Ear: Tympanic membrane normal.      Left Ear: Tympanic membrane normal.   Eyes:      General: No scleral icterus.     Conjunctiva/sclera: Conjunctivae normal.   Neck:      Vascular: No carotid bruit.   Cardiovascular:      Rate and Rhythm: Normal rate and regular rhythm.      Pulses: no weak pulses.           Dorsalis pedis pulses are 2+ on the right side and 2+ on the left side.   Pulmonary:      Effort: Pulmonary effort is normal. No respiratory distress.      Breath sounds: No wheezing.   Abdominal:      General: There is no distension.      Palpations: Abdomen is soft.   Musculoskeletal:         General: No deformity.      Cervical back: Neck " supple.      Right lower leg: No edema.      Left lower leg: No edema.   Skin:     General: Skin is warm and dry.      Coloration: Skin is not pale.      Findings: No erythema or rash.   Neurological:      Mental Status: He is alert.      Motor: No weakness.      Gait: Gait normal.   Psychiatric:         Mood and Affect: Mood normal.         Behavior: Behavior normal.         Thought Content: Thought content normal.       Diabetic Foot Exam    Patient's shoes and socks removed.    Right Foot/Ankle   Right Foot Inspection  Skin Exam: skin intact. No abnormal color.     Sensory   Monofilament testing: intact    Vascular  The right DP pulse is 2+.     Left Foot/Ankle  Left Foot Inspection  Skin Exam: skin intact. Normal color.     Sensory   Monofilament testing: intact    Vascular  The left DP pulse is 2+.     Assign Risk Category  No deformity present  No loss of protective sensation  No weak pulses  Risk: 0           Porfirio Law DO

## 2024-07-02 ENCOUNTER — TELEPHONE (OUTPATIENT)
Dept: FAMILY MEDICINE CLINIC | Facility: CLINIC | Age: 74
End: 2024-07-02

## 2024-07-10 ENCOUNTER — HOSPITAL ENCOUNTER (EMERGENCY)
Facility: HOSPITAL | Age: 74
Discharge: HOME | End: 2024-07-10
Attending: EMERGENCY MEDICINE | Admitting: EMERGENCY MEDICINE
Payer: MEDICARE

## 2024-07-10 VITALS
TEMPERATURE: 98.2 F | RESPIRATION RATE: 20 BRPM | SYSTOLIC BLOOD PRESSURE: 142 MMHG | OXYGEN SATURATION: 97 % | BODY MASS INDEX: 26.52 KG/M2 | DIASTOLIC BLOOD PRESSURE: 68 MMHG | WEIGHT: 165 LBS | HEART RATE: 90 BPM | HEIGHT: 66 IN

## 2024-07-10 DIAGNOSIS — B02.29 POST HERPETIC NEURALGIA: Primary | ICD-10-CM

## 2024-07-10 PROCEDURE — 99283 EMERGENCY DEPT VISIT LOW MDM: CPT

## 2024-07-10 PROCEDURE — 99284 EMERGENCY DEPT VISIT MOD MDM: CPT | Performed by: EMERGENCY MEDICINE

## 2024-07-10 RX ORDER — GABAPENTIN 300 MG/1
CAPSULE ORAL
Qty: 45 CAPSULE | Refills: 0 | Status: SHIPPED | OUTPATIENT
Start: 2024-07-10 | End: 2024-07-26

## 2024-07-10 RX ORDER — GABAPENTIN 100 MG/1
100 CAPSULE ORAL ONCE
Status: CANCELLED | OUTPATIENT
Start: 2024-07-10 | End: 2024-07-10

## 2024-07-10 NOTE — ED PROVIDER NOTES
History  Chief Complaint   Patient presents with    Rib Pain     Patient, who just had shingles, lesions are dry and almost unnoticeable, having pain and discomfort to that area, states even when he barely touches the skin, jonn     Pt is a 74yo M who presents for shingles pain.  Patient reports he was diagnosed with shingles several months ago.  Patient reports since that time he continues to have itching and burning in the same area.  Patient reports he has followed with his PCP who recently increased his gabapentin from 100 daily to 200 daily.  Patient reports no improvement.  Patient reports he has also tried lidocaine patches but they have not been improving.  Patient states he attempted to call his PCP and was unable to get a hold of anyone and therefore came in here for further evaluation.  Patient denies any other pain or complaints.        Prior to Admission Medications   Prescriptions Last Dose Informant Patient Reported? Taking?   atorvastatin (LIPITOR) 20 mg tablet   No Yes   Sig: TAKE ONE TABLET BY MOUTH EVERY DAY   esomeprazole (NexIUM) 20 mg capsule  Self Yes Yes   Sig: Take 20 mg by mouth every morning before breakfast   gabapentin (NEURONTIN) 100 mg capsule   No Yes   Sig: Take 3 capsules (300 mg total) by mouth daily at bedtime   metFORMIN (GLUCOPHAGE-XR) 500 mg 24 hr tablet   No Yes   Sig: Take 4 tablets (2,000 mg total) by mouth daily with dinner   sildenafil (VIAGRA) 100 mg tablet   No Yes   Sig: Take 1 tablet (100 mg total) by mouth as needed for erectile dysfunction Dispense BRAND VIAGRA      Facility-Administered Medications: None       Past Medical History:   Diagnosis Date    GERD (gastroesophageal reflux disease)     Hiatal hernia     Presence of upper and lower permanent dental bridges     upper/lower    Stroke-like symptom 04/05/2022    Vertigo     Wears glasses     for reading       Past Surgical History:   Procedure Laterality Date    COLONOSCOPY         History reviewed. No pertinent  family history.  I have reviewed and agree with the history as documented.    E-Cigarette/Vaping    E-Cigarette Use Never User      E-Cigarette/Vaping Substances    Nicotine No     THC No     CBD No     Flavoring No     Other No     Unknown No      Social History     Tobacco Use    Smoking status: Never     Passive exposure: Never    Smokeless tobacco: Never   Vaping Use    Vaping status: Never Used   Substance Use Topics    Alcohol use: Not Currently    Drug use: Never       Physical Exam  Physical Exam  Vitals reviewed.   Constitutional:       General: He is not in acute distress.     Appearance: He is well-developed. He is not diaphoretic.   HENT:      Head: Normocephalic and atraumatic.      Right Ear: External ear normal.      Left Ear: External ear normal.      Nose: Nose normal.      Mouth/Throat:      Pharynx: Oropharynx is clear.   Eyes:      Extraocular Movements: Extraocular movements intact.      Pupils: Pupils are equal, round, and reactive to light.   Cardiovascular:      Rate and Rhythm: Normal rate and regular rhythm.      Heart sounds: Normal heart sounds. No murmur heard.  Pulmonary:      Effort: Pulmonary effort is normal. No respiratory distress.      Breath sounds: Normal breath sounds.   Abdominal:      General: Bowel sounds are normal. There is no distension.      Palpations: Abdomen is soft.      Tenderness: There is no abdominal tenderness.   Musculoskeletal:         General: No tenderness or deformity. Normal range of motion.      Cervical back: Neck supple.   Skin:     General: Skin is warm and dry.      Capillary Refill: Capillary refill takes less than 2 seconds.      Coloration: Skin is not pale.          Neurological:      General: No focal deficit present.      Mental Status: He is alert and oriented to person, place, and time.   Psychiatric:         Speech: Speech normal.         Behavior: Behavior is cooperative.         Vital Signs  ED Triage Vitals [07/10/24 0933]   Temperature  Pulse Respirations Blood Pressure SpO2   98.2 °F (36.8 °C) 90 20 142/68 97 %      Temp Source Heart Rate Source Patient Position - Orthostatic VS BP Location FiO2 (%)   Oral Monitor Sitting Left arm --      Pain Score       --           Vitals:    07/10/24 0933   BP: 142/68   Pulse: 90   Patient Position - Orthostatic VS: Sitting         Visual Acuity      ED Medications  Medications - No data to display    Diagnostic Studies  Results Reviewed       None                   No orders to display              Procedures  Procedures         ED Course                                 SBIRT 22yo+      Flowsheet Row Most Recent Value   Initial Alcohol Screen: US AUDIT-C     1. How often do you have a drink containing alcohol? 0 Filed at: 07/10/2024 0934   2. How many drinks containing alcohol do you have on a typical day you are drinking?  0 Filed at: 07/10/2024 0934   3a. Male UNDER 65: How often do you have five or more drinks on one occasion? 0 Filed at: 07/10/2024 0934   3b. FEMALE Any Age, or MALE 65+: How often do you have 4 or more drinks on one occassion? 0 Filed at: 07/10/2024 0934   Audit-C Score 0 Filed at: 07/10/2024 0934   [place]: How many times in the past year have you...    Used an illegal drug or used a prescription medication for non-medical reasons? Never Filed at: 07/10/2024 0934                      Medical Decision Making  Patient with postherpetic neuralgia.  Will increase gabapentin per up-to-date recommended dosing for postherpetic neuralgia.  Will encourage patient to continue with lidocaine patches.  Will encourage PCP follow-up.    Plan to discharge pt with f/u to PCP. Discussed returning the ED with new or worsening of symptoms. Discussed use of over the counter medications as stated on the bottle as needed for pain. Discussed taking new medication as prescribed until seen by PCP. Pt expressed understanding of discharge instructions, return precautions, and medication instructions and is stable for  discharge at this time. All questions were answered and pt was discharged without incident.       Risk  Prescription drug management.                 Disposition  Final diagnoses:   Post herpetic neuralgia     Time reflects when diagnosis was documented in both MDM as applicable and the Disposition within this note       Time User Action Codes Description Comment    7/10/2024  9:47 AM Nadiya Ordonez Add [B02.29] Post herpetic neuralgia           ED Disposition       ED Disposition   Discharge    Condition   Stable    Date/Time   Wed Jul 10, 2024 0947    Comment   Toan Destinvil discharge to home/self care.                   Follow-up Information       Follow up With Specialties Details Why Contact Info    Porfirio Law DO Family Medicine Call on 7/10/2024  [address] [facility]  [address]  [place] [address]  [phone]              Discharge Medication List as of 7/10/2024  9:50 AM        START taking these medications    Details   !! gabapentin (Neurontin) 300 mg capsule Multiple Dosages:Starting Wed 7/10/2024, Until Wed 7/10/2024 at 2359, THEN Starting Thu 7/11/2024, Until Thu 7/11/2024 at 2359, THEN Starting Fri 7/12/2024, Until Thu 7/25/2024 at 2359Take 1 capsule (300 mg total) by mouth daily for 1 day, THEN 1 cap alan (300 mg total) 2 (two) times a day for 1 day, THEN 1 capsule (300 mg total) 3 (three) times a day for 14 days. For post-herpetic neuralgia: Take 1 tablet on day 1,  Then take 2 tablets on day 2, Then take 3 tablets on day 3 and every day after that  as instructed by your doctor.., Normal       !! - Potential duplicate medications found. Please discuss with provider.        CONTINUE these medications which have NOT CHANGED    Details   atorvastatin (LIPITOR) 20 mg tablet TAKE ONE TABLET BY MOUTH EVERY DAY, Starting Mon 2/26/2024, Normal      esomeprazole (NexIUM) 20 mg capsule Take 20 mg by mouth every morning before breakfast, Historical Med      !! gabapentin (NEURONTIN) 100 mg capsule  Take 3 capsules (300 mg total) by mouth daily at bedtime, Starting Tue 6/11/2024, Normal      metFORMIN (GLUCOPHAGE-XR) 500 mg 24 hr tablet Take 4 tablets (2,000 mg total) by mouth daily with dinner, Starting Tue 6/11/2024, Normal      sildenafil (VIAGRA) 100 mg tablet Take 1 tablet (100 mg total) by mouth as needed for erectile dysfunction Dispense BRAND VIAGRA, Starting Thu 2/29/2024, Normal       !! - Potential duplicate medications found. Please discuss with provider.          No discharge procedures on file.    PDMP Review       None            ED Provider  Electronically Signed by             Nadiya Ordonez MD  07/10/24 6935

## 2024-07-10 NOTE — DISCHARGE INSTRUCTIONS
Follow-up with primary care for further care. Contact info provided below if needed.  Use over the counter medications as stated on the bottle as needed for pain control.  Use previously prescribed lidocaine patches.   Take the gabapentin at the new prescribed dose.   Return to the ED with new or worsening symptoms.

## 2024-07-31 ENCOUNTER — RA CDI HCC (OUTPATIENT)
Dept: OTHER | Facility: HOSPITAL | Age: 74
End: 2024-07-31

## 2024-08-05 ENCOUNTER — TELEPHONE (OUTPATIENT)
Dept: FAMILY MEDICINE CLINIC | Facility: CLINIC | Age: 74
End: 2024-08-05

## 2024-08-05 DIAGNOSIS — B02.29 PHN (POSTHERPETIC NEURALGIA): ICD-10-CM

## 2024-08-05 DIAGNOSIS — B02.29 POST HERPETIC NEURALGIA: ICD-10-CM

## 2024-08-05 RX ORDER — GABAPENTIN 600 MG/1
600 TABLET ORAL 3 TIMES DAILY
Qty: 90 TABLET | Refills: 2 | Status: SHIPPED | OUTPATIENT
Start: 2024-08-05

## 2024-08-06 NOTE — TELEPHONE ENCOUNTER
Occupational Therapy Visit    Visit Type: Daily Treatment Note -  Occupational Therapy  Visit: 6  Referring Provider: Gerry Sharma MD  Medical Diagnosis (from order): Diagnosis Information    Diagnosis  726.10 (ICD-9-CM) - M67.911 (ICD-10-CM) - Rotator cuff disorder, right       Patient alert and oriented X3.    SUBJECTIVE                                                                                                               Per patient:    Patient came with all the HEP and has been doing them. IR and ER towel exercises increased pain    Pain / Symptoms  - Pain rating (out of 10): Current: 1      OBJECTIVE                                                                                                                                       Treatment     Therapeutic Exercise  Patient is automatically adjusting his posture    Advanced to [place] RCR phase 7: isometrics: 2-3 / week. Completed all 4 exercises    UBE initiated. 2 min forward and 2 min back. Standing. RPM's 40's     Copies in Slovak provided to patient making sure that he has phase 1-7 at this time. Patient does not have phase 3 : pulleys as he has moved out of that phase    Skilled input: verbal instruction/cues, tactile instruction/cues, posture correction, as detailed above and demonstration    Writer verbally educated and received verbal consent for hand placement, positioning of patient, and techniques to be performed today from patient for clothing adjustments for techniques, therapist position for techniques and hand placement and palpation for techniques as described above and how they are pertinent to the patient's plan of care.    Home Exercise Program  Ndi RCR phase 7: isometrics  Phase 3: Pulleys . [place] hand RCR  Phase 4: early active motion : supine  Phase 6: stretching : end range-- english needs Slovak    Phase 5: active motion: instructed HEP: 5-10 reps. 3-4 times a day. OT modifications. [place] version    [place] hand RCR:  Sw pt  Still a lot of pain  Some benefit at 300mg tid when taken but ran out  Advised withdrawal risk  Willing to retry with plan to take 600mg tid, but start 600mg/d x 3d then bid x 3d then tid  If no better after 1m or not tolerated, will offer referral to px mgmt  If does ok for 3m, can make appt to discuss wean trial  Pt agreeable  Ok to cancel appt tomorrow, he will call office in am   phase 1 in Swedish  [place] hand RCR: phase  2 in Swedish. Supine: dowel  Door pulleys: handout provided to purchase for home useSuo  Ice massage  Scar massage          ASSESSMENT                                                                                                              Patient evaluated this date with diagnosis: 3/7/2023 s/p Right Shoulder Arthroscopy with:  1. Arthroscopic Rotator Cuff Repair   2. Subacromial Decompression  3. Arthroscopic distal clavicle excision    Advanced HEP. Handouts provided in Swedish. Excellent progress . Patient is on phase 6 out of 8 phases.     5 weeks post op. Incredible progress. Shoulder flexion is easily at 120 + unilateral as well as bilateral. OT cont to instruct to not lift, push, pull more than 5 #. Not move into sharp or shooting pain. To cont with exercises and be patient for the strengthening as shoulder needs to heal from the inside post op.    Plan to evaluate to progress to phase 8 strengthening    : [id number]    Pain/symptoms after session (out of 10): 1  Education:   - Present and ready to learn: patient  - Results of above outlined education: Needs reinforcement    PLAN                                                                                                                           Suggestions for next session as indicated: Progress per plan of care  Phase UBE initiate       Therapy procedure time and total treatment time can be found documented on the Time Entry flowsheet

## 2024-08-30 ENCOUNTER — TELEPHONE (OUTPATIENT)
Dept: FAMILY MEDICINE CLINIC | Facility: CLINIC | Age: 74
End: 2024-08-30

## 2024-08-30 DIAGNOSIS — B02.29 PHN (POSTHERPETIC NEURALGIA): ICD-10-CM

## 2024-08-30 RX ORDER — PREGABALIN 75 MG/1
75 CAPSULE ORAL 2 TIMES DAILY
Qty: 60 CAPSULE | Refills: 2 | Status: SHIPPED | OUTPATIENT
Start: 2024-08-30

## 2024-08-30 NOTE — TELEPHONE ENCOUNTER
Sw pt  Joe 600mg tid not effectivie  Lidoderm not effective  Advised trial of lyrica and report response after 1w   Can consider px mgmt referral

## 2024-09-10 ENCOUNTER — TELEPHONE (OUTPATIENT)
Dept: FAMILY MEDICINE CLINIC | Facility: CLINIC | Age: 74
End: 2024-09-10

## 2024-09-10 DIAGNOSIS — B02.29 PHN (POSTHERPETIC NEURALGIA): ICD-10-CM

## 2024-09-10 RX ORDER — PREGABALIN 150 MG/1
150 CAPSULE ORAL 2 TIMES DAILY
Qty: 60 CAPSULE | Refills: 2 | Status: SHIPPED | OUTPATIENT
Start: 2024-09-10

## 2024-09-10 NOTE — TELEPHONE ENCOUNTER
Patient came into office to drop off a handwritten note for Dr. Law to review. Letter is about how patient is still in pain. Placed letter in Dr. Law's folder. Patient requesting Dr. Law call him whenever he has a moment at [phone]

## 2024-09-11 NOTE — TELEPHONE ENCOUNTER
Sw pt about PHN, not better  Increase lyrica 75mg bid to 150mg bid  If not helpful, can call for px mgmt referral

## 2024-09-12 LAB
ALBUMIN SERPL-MCNC: 4.3 G/DL (ref 3.6–5.1)
ALBUMIN/CREAT UR: 10 MG/G CREAT
ALBUMIN/GLOB SERPL: 1.3 (CALC) (ref 1–2.5)
ALP SERPL-CCNC: 72 U/L (ref 35–144)
ALT SERPL-CCNC: 24 U/L (ref 9–46)
AST SERPL-CCNC: 18 U/L (ref 10–35)
BILIRUB SERPL-MCNC: 0.8 MG/DL (ref 0.2–1.2)
BUN SERPL-MCNC: 11 MG/DL (ref 7–25)
BUN/CREAT SERPL: ABNORMAL (CALC) (ref 6–22)
CALCIUM SERPL-MCNC: 9.8 MG/DL (ref 8.6–10.3)
CHLORIDE SERPL-SCNC: 102 MMOL/L (ref 98–110)
CHOLEST SERPL-MCNC: 132 MG/DL
CHOLEST/HDLC SERPL: 2.6 (CALC)
CO2 SERPL-SCNC: 33 MMOL/L (ref 20–32)
CREAT SERPL-MCNC: 1.06 MG/DL (ref 0.7–1.28)
CREAT UR-MCNC: 176 MG/DL (ref 20–320)
GFR/BSA.PRED SERPLBLD CYS-BASED-ARV: 74 ML/MIN/1.73M2
GLOBULIN SER CALC-MCNC: 3.2 G/DL (CALC) (ref 1.9–3.7)
GLUCOSE SERPL-MCNC: 177 MG/DL (ref 65–99)
HBA1C MFR BLD: 8.6 % OF TOTAL HGB
HDLC SERPL-MCNC: 51 MG/DL
LDLC SERPL CALC-MCNC: 70 MG/DL (CALC)
MICROALBUMIN UR-MCNC: 1.7 MG/DL
NONHDLC SERPL-MCNC: 81 MG/DL (CALC)
POTASSIUM SERPL-SCNC: 4.1 MMOL/L (ref 3.5–5.3)
PROT SERPL-MCNC: 7.5 G/DL (ref 6.1–8.1)
SODIUM SERPL-SCNC: 141 MMOL/L (ref 135–146)
TRIGL SERPL-MCNC: 37 MG/DL

## 2024-11-05 ENCOUNTER — OFFICE VISIT (OUTPATIENT)
Dept: FAMILY MEDICINE CLINIC | Facility: CLINIC | Age: 74
End: 2024-11-05
Payer: MEDICARE

## 2024-11-05 VITALS
WEIGHT: 170 LBS | TEMPERATURE: 97.6 F | RESPIRATION RATE: 18 BRPM | HEIGHT: 66 IN | DIASTOLIC BLOOD PRESSURE: 80 MMHG | SYSTOLIC BLOOD PRESSURE: 140 MMHG | HEART RATE: 88 BPM | BODY MASS INDEX: 27.32 KG/M2 | OXYGEN SATURATION: 97 %

## 2024-11-05 DIAGNOSIS — Z00.00 MEDICARE ANNUAL WELLNESS VISIT, SUBSEQUENT: Primary | ICD-10-CM

## 2024-11-05 DIAGNOSIS — E78.49 OTHER HYPERLIPIDEMIA: ICD-10-CM

## 2024-11-05 DIAGNOSIS — B02.29 PHN (POSTHERPETIC NEURALGIA): ICD-10-CM

## 2024-11-05 DIAGNOSIS — N18.2 TYPE 2 DIABETES MELLITUS WITH STAGE 2 CHRONIC KIDNEY DISEASE, WITHOUT LONG-TERM CURRENT USE OF INSULIN  (HCC): ICD-10-CM

## 2024-11-05 DIAGNOSIS — N52.9 ERECTILE DYSFUNCTION, UNSPECIFIED ERECTILE DYSFUNCTION TYPE: ICD-10-CM

## 2024-11-05 DIAGNOSIS — E11.22 TYPE 2 DIABETES MELLITUS WITH STAGE 2 CHRONIC KIDNEY DISEASE, WITHOUT LONG-TERM CURRENT USE OF INSULIN  (HCC): ICD-10-CM

## 2024-11-05 PROCEDURE — G0439 PPPS, SUBSEQ VISIT: HCPCS | Performed by: FAMILY MEDICINE

## 2024-11-05 PROCEDURE — 99214 OFFICE O/P EST MOD 30 MIN: CPT | Performed by: FAMILY MEDICINE

## 2024-11-05 RX ORDER — SILDENAFIL 100 MG/1
100 TABLET, FILM COATED ORAL AS NEEDED
Qty: 30 TABLET | Refills: 5 | Status: SHIPPED | OUTPATIENT
Start: 2024-11-05

## 2024-11-05 RX ORDER — PREGABALIN 165 MG/1
3 TABLET, FILM COATED, EXTENDED RELEASE ORAL DAILY
Qty: 90 TABLET | Refills: 5 | Status: SHIPPED | OUTPATIENT
Start: 2024-11-05 | End: 2024-11-13 | Stop reason: SDUPTHER

## 2024-11-05 NOTE — PROGRESS NOTES
Assessment/Plan:    No problem-specific Assessment & Plan notes found for this encounter.    DM2 not at goal  Cont metformin   Add jardiance  Risks/benefits explained  F/u 3m    ED tolerates viagra  Refilled    HLD stable on statin  Last LDL 70    PHN  Not controlled on lyrica 150mg bid  Change to ER and dosing up to 660mg advised  Update response in about 10d     Diagnoses and all orders for this visit:    Medicare annual wellness visit, subsequent    Type 2 diabetes mellitus with stage 2 chronic kidney disease, without long-term current use of insulin  (HCC)  -     Comprehensive metabolic panel; Future  -     Hemoglobin A1C; Future  -     Comprehensive metabolic panel  -     Empagliflozin (JARDIANCE) 10 MG TABS tablet; Take 1 tablet (10 mg total) by mouth daily    Other hyperlipidemia    PHN (postherpetic neuralgia)  -     Pregabalin  MG TB24; Take 3 tablets by mouth daily    Erectile dysfunction, unspecified erectile dysfunction type  -     sildenafil (VIAGRA) 100 mg tablet; Take 1 tablet (100 mg total) by mouth as needed for erectile dysfunction        Return in about 13 weeks (around 2/4/2025).    Subjective:      Patient ID: Toan Wong is a 73 y.o. male.    Chief Complaint   Patient presents with    Follow-up     Shingles  [id number]       HPI  Had opto exam 2w ago  Taking meformin, occas diarrhea  Low carb diet followed, some exercise daily  Some biking    Does smbg  Good per pt    Phn getting worse again  On lyrica 150mg bid    Lab Results   Component Value Date    HGBA1C 8.6 (H) 09/11/2024    HGBA1C 8.6 (H) 05/24/2024    HGBA1C 8.2 (A) 01/23/2024     Lab Results   Component Value Date    GLUF 137 (H) 04/06/2022    LDLCALC 70 09/11/2024    CREATININE 1.06 09/11/2024       The following portions of the patient's history were reviewed and updated as appropriate: allergies, current medications, past family history, past medical history, past social history, past surgical history and problem  "list.    Review of Systems   Constitutional:  Negative for chills and fever.         Current Outpatient Medications   Medication Sig Dispense Refill    atorvastatin (LIPITOR) 20 mg tablet TAKE ONE TABLET BY MOUTH EVERY DAY 30 tablet 0    Empagliflozin (JARDIANCE) 10 MG TABS tablet Take 1 tablet (10 mg total) by mouth daily 90 tablet 1    esomeprazole (NexIUM) 20 mg capsule Take 20 mg by mouth every morning before breakfast      metFORMIN (GLUCOPHAGE-XR) 500 mg 24 hr tablet Take 4 tablets (2,000 mg total) by mouth daily with dinner 360 tablet 1    Pregabalin  MG TB24 Take 3 tablets by mouth daily 90 tablet 5    sildenafil (VIAGRA) 100 mg tablet Take 1 tablet (100 mg total) by mouth as needed for erectile dysfunction 30 tablet 5     No current facility-administered medications for this visit.       Objective:    /80   Pulse 88   Temp 97.6 °F (36.4 °C)   Resp 18   Ht 5' 6\" (1.676 m)   Wt 77.1 kg (170 lb)   SpO2 97%   BMI 27.44 kg/m²        Physical Exam  Vitals and nursing note reviewed.   Constitutional:       General: He is not in acute distress.     Appearance: He is well-developed. He is not ill-appearing.   HENT:      Head: Normocephalic.      Right Ear: Tympanic membrane normal.      Left Ear: Tympanic membrane normal.   Eyes:      General: No scleral icterus.     Conjunctiva/sclera: Conjunctivae normal.   Cardiovascular:      Rate and Rhythm: Normal rate and regular rhythm.      Heart sounds: No murmur heard.  Pulmonary:      Effort: Pulmonary effort is normal. No respiratory distress.      Breath sounds: No wheezing.   Abdominal:      General: There is no distension.      Palpations: Abdomen is soft.   Musculoskeletal:         General: Tenderness present. No deformity.      Cervical back: Neck supple.      Comments: Right flank from PHN   Skin:     General: Skin is warm and dry.      Coloration: Skin is not pale.      Findings: No rash.   Neurological:      Mental Status: He is alert.      " Motor: No weakness.      Gait: Gait normal.   Psychiatric:         Mood and Affect: Mood normal.         Behavior: Behavior normal.         Thought Content: Thought content normal.                Porfirio Law,

## 2024-11-05 NOTE — PATIENT INSTRUCTIONS
We can add another pill called Jardiance for diabetes and continue the metformin.    Other medications for diabetes could include:    Farxiga  Jardiance     Wendy Quintero    If the lyrica 150mg twice a day is not good enough, then we change the lyrica to extended release 165mg per day and give you up to 4 per day maximum and can take all at once, once a day.  Medicare Preventive Visit Patient Instructions  Thank you for completing your Welcome to Medicare Visit or Medicare Annual Wellness Visit today. Your next wellness visit will be due in one year (11/7/2025).  The screening/preventive services that you may require over the next 5-10 years are detailed below. Some tests may not apply to you based off risk factors and/or age. Screening tests ordered at today's visit but not completed yet may show as past due. Also, please note that scanned in results may not display below.  Preventive Screenings:  Service Recommendations Previous Testing/Comments   Colorectal Cancer Screening  Colonoscopy    Fecal Occult Blood Test (FOBT)/Fecal Immunochemical Test (FIT)  Fecal DNA/Cologuard Test  Flexible Sigmoidoscopy Age: 45-75 years old   Colonoscopy: every 10 years (May be performed more frequently if at higher risk)  OR  FOBT/FIT: every 1 year  OR  Cologuard: every 3 years  OR  Sigmoidoscopy: every 5 years  Screening may be recommended earlier than age 45 if at higher risk for colorectal cancer. Also, an individualized decision between you and your healthcare provider will decide whether screening between the ages of 76-85 would be appropriate. Colonoscopy: 05/25/2022  FOBT/FIT: Not on file  Cologuard: Not on file  Sigmoidoscopy: Not on file          Prostate Cancer Screening Individualized decision between patient and health care provider in men between ages of 55-69   Medicare will cover every 12 months beginning on the day after your 50th birthday PSA: 4.10  ng/mL           Hepatitis C Screening Once for adults born between 1945 and 1965  More frequently in patients at high risk for Hepatitis C Hep C Antibody: Not on file        Diabetes Screening 1-2 times per year if you're at risk for diabetes or have pre-diabetes Fasting glucose: 137 mg/dL (4/6/2022)  A1C: 8.6 % of total Hgb (9/11/2024)      Cholesterol Screening Once every 5 years if you don't have a lipid disorder. May order more often based on risk factors. Lipid panel: 09/11/2024         Other Preventive Screenings Covered by Medicare:  Abdominal Aortic Aneurysm (AAA) Screening: covered once if your at risk. You're considered to be at risk if you have a family history of AAA or a male between the age of 65-75 who smoking at least 100 cigarettes in your lifetime.  Lung Cancer Screening: covers low dose CT scan once per year if you meet all of the following conditions: (1) Age 55-77; (2) No signs or symptoms of lung cancer; (3) Current smoker or have quit smoking within the last 15 years; (4) You have a tobacco smoking history of at least 20 pack years (packs per day x number of years you smoked); (5) You get a written order from a healthcare provider.  Glaucoma Screening: covered annually if you're considered high risk: (1) You have diabetes OR (2) Family history of glaucoma OR (3)  aged 50 and older OR (4)  American aged 65 and older  Osteoporosis Screening: covered every 2 years if you meet one of the following conditions: (1) Have a vertebral abnormality; (2) On glucocorticoid therapy for more than 3 months; (3) Have primary hyperparathyroidism; (4) On osteoporosis medications and need to assess response to drug therapy.  HIV Screening: covered annually if you're between the age of 15-65. Also covered annually if you are younger than 15 and older than 65 with risk factors for HIV infection. For pregnant patients, it is covered up to 3 times per pregnancy.    Immunizations:  Immunization  Recommendations   Influenza Vaccine Annual influenza vaccination during flu season is recommended for all persons aged >= 6 months who do not have contraindications   Pneumococcal Vaccine   * Pneumococcal conjugate vaccine = PCV13 (Prevnar 13), PCV15 (Vaxneuvance), PCV20 (Prevnar 20)  * Pneumococcal polysaccharide vaccine = PPSV23 (Pneumovax) Adults 19-63 yo with certain risk factors or if 65+ yo  If never received any pneumonia vaccine: recommend Prevnar 20 (PCV20)  Give PCV20 if previously received 1 dose of PCV13 or PPSV23   Hepatitis B Vaccine 3 dose series if at intermediate or high risk (ex: diabetes, end stage renal disease, liver disease)   Respiratory syncytial virus (RSV) Vaccine - COVERED BY MEDICARE PART D  * RSVPreF3 (Arexvy) CDC recommends that adults 60 years of age and older may receive a single dose of RSV vaccine using shared clinical decision-making (SCDM)   Tetanus (Td) Vaccine - COST NOT COVERED BY MEDICARE PART B Following completion of primary series, a booster dose should be given every 10 years to maintain immunity against tetanus. Td may also be given as tetanus wound prophylaxis.   Tdap Vaccine - COST NOT COVERED BY MEDICARE PART B Recommended at least once for all adults. For pregnant patients, recommended with each pregnancy.   Shingles Vaccine (Shingrix) - COST NOT COVERED BY MEDICARE PART B  2 shot series recommended in those 19 years and older who have or will have weakened immune systems or those 50 years and older     Health Maintenance Due:      Topic Date Due   • Colorectal Cancer Screening  05/25/2023   • Hepatitis C Screening  05/06/2035 (Originally 1950)     Immunizations Due:      Topic Date Due   • Influenza Vaccine (1) 09/01/2024   • COVID-19 Vaccine (3 - 2023-24 season) 09/01/2024     Advance Directives   What are advance directives?  Advance directives are legal documents that state your wishes and plans for medical care. These plans are made ahead of time in case  you lose your ability to make decisions for yourself. Advance directives can apply to any medical decision, such as the treatments you want, and if you want to donate organs.   What are the types of advance directives?  There are many types of advance directives, and each state has rules about how to use them. You may choose a combination of any of the following:  Living will:  This is a written record of the treatment you want. You can also choose which treatments you do not want, which to limit, and which to stop at a certain time. This includes surgery, medicine, IV fluid, and tube feedings.   Durable power of  for healthcare (DPAHC):  This is a written record that states who you want to make healthcare choices for you when you are unable to make them for yourself. This person, called a proxy, is usually a family member or a friend. You may choose more than 1 proxy.  Do not resuscitate (DNR) order:  A DNR order is used in case your heart stops beating or you stop breathing. It is a request not to have certain forms of treatment, such as CPR. A DNR order may be included in other types of advance directives.  Medical directive:  This covers the care that you want if you are in a coma, near death, or unable to make decisions for yourself. You can list the treatments you want for each condition. Treatment may include pain medicine, surgery, blood transfusions, dialysis, IV or tube feedings, and a ventilator (breathing machine).  Values history:  This document has questions about your views, beliefs, and how you feel and think about life. This information can help others choose the care that you would choose.  Why are advance directives important?  An advance directive helps you control your care. Although spoken wishes may be used, it is better to have your wishes written down. Spoken wishes can be misunderstood, or not followed. Treatments may be given even if you do not want them. An advance directive may  make it easier for your family to make difficult choices about your care.   Weight Management   Why it is important to manage your weight:  Being overweight increases your risk of health conditions such as heart disease, high blood pressure, type 2 diabetes, and certain types of cancer. It can also increase your risk for osteoarthritis, sleep apnea, and other respiratory problems. Aim for a slow, steady weight loss. Even a small amount of weight loss can lower your risk of health problems.  How to lose weight safely:  A safe and healthy way to lose weight is to eat fewer calories and get regular exercise. You can lose up about 1 pound a week by decreasing the number of calories you eat by 500 calories each day.   Healthy meal plan for weight management:  A healthy meal plan includes a variety of foods, contains fewer calories, and helps you stay healthy. A healthy meal plan includes the following:  Eat whole-grain foods more often.  A healthy meal plan should contain fiber. Fiber is the part of grains, fruits, and vegetables that is not broken down by your body. Whole-grain foods are healthy and provide extra fiber in your diet. Some examples of whole-grain foods are whole-wheat breads and pastas, oatmeal, brown rice, and bulgur.  Eat a variety of vegetables every day.  Include dark, leafy greens such as spinach, kale, nikky greens, and mustard greens. Eat yellow and orange vegetables such as carrots, sweet potatoes, and winter squash.   Eat a variety of fruits every day.  Choose fresh or canned fruit (canned in its own juice or light syrup) instead of juice. Fruit juice has very little or no fiber.  Eat low-fat dairy foods.  Drink fat-free (skim) milk or 1% milk. Eat fat-free yogurt and low-fat cottage cheese. Try low-fat cheeses such as mozzarella and other reduced-fat cheeses.  Choose meat and other protein foods that are low in fat.  Choose beans or other legumes such as split peas or lentils. Choose fish,  skinless poultry (chicken or turkey), or lean cuts of red meat (beef or pork). Before you cook meat or poultry, cut off any visible fat.   Use less fat and oil.  Try baking foods instead of frying them. Add less fat, such as margarine, sour cream, regular salad dressing and mayonnaise to foods. Eat fewer high-fat foods. Some examples of high-fat foods include french fries, doughnuts, ice cream, and cakes.  Eat fewer sweets.  Limit foods and drinks that are high in sugar. This includes candy, cookies, regular soda, and sweetened drinks.  Exercise:  Exercise at least 30 minutes per day on most days of the week. Some examples of exercise include walking, biking, dancing, and swimming. You can also fit in more physical activity by taking the stairs instead of the elevator or parking farther away from stores. Ask your healthcare provider about the best exercise plan for you.      © Copyright [employer] 2018 Information is for End User's use only and may not be sold, redistributed or otherwise used for commercial purposes. All illustrations and images included in CareNotes® are the copyrighted property of [employer] Inc[employer] or kompany

## 2024-11-06 NOTE — ASSESSMENT & PLAN NOTE
Lab Results   Component Value Date    HGBA1C 8.6 (H) 09/11/2024       Orders:    Comprehensive metabolic panel; Future    Hemoglobin A1C; Future    Comprehensive metabolic panel    Empagliflozin (JARDIANCE) 10 MG TABS tablet; Take 1 tablet (10 mg total) by mouth daily

## 2024-11-06 NOTE — PROGRESS NOTES
Ambulatory Visit  Name: Toan Wong      : 1950      MRN: [id number]  Encounter Provider: Porfirio Law DO  Encounter Date: 2024   Encounter department: [facility]    Assessment & Plan  Type 2 diabetes mellitus with stage 2 chronic kidney disease, without long-term current use of insulin  ([facility])    Lab Results   Component Value Date    HGBA1C 8.6 (H) 2024       Orders:    Comprehensive metabolic panel; Future    Hemoglobin A1C; Future    Comprehensive metabolic panel    Empagliflozin (JARDIANCE) 10 MG TABS tablet; Take 1 tablet (10 mg total) by mouth daily    Other hyperlipidemia         PHN (postherpetic neuralgia)    Orders:    Pregabalin  MG TB24; Take 3 tablets by mouth daily    Erectile dysfunction, unspecified erectile dysfunction type    Orders:    sildenafil (VIAGRA) 100 mg tablet; Take 1 tablet (100 mg total) by mouth as needed for erectile dysfunction    Medicare annual wellness visit, subsequent            Preventive health issues were discussed with patient, and age appropriate screening tests were ordered as noted in patient's After Visit Summary. Personalized health advice and appropriate referrals for health education or preventive services given if needed, as noted in patient's After Visit Summary.    History of Present Illness     HPI   Patient Care Team:  Porfirio Law DO as PCP - General (Family Medicine)    Review of Systems  Medical History Reviewed by provider this encounter:  Tobacco  Allergies  Meds  Problems  Med Hx  Surg Hx  Fam Hx       Annual Wellness Visit Questionnaire   Toan is here for his Subsequent Wellness visit. Last Medicare Wellness visit information reviewed, patient interviewed and updates made to the record today.      Health Risk Assessment:   Patient rates overall health as good. Patient feels that their physical health rating is same. Patient is satisfied with their life. Eyesight was rated as same. Hearing was  rated as same. Patient feels that their emotional and mental health rating is same. Patients states they are never, rarely angry. Patient states they are sometimes unusually tired/fatigued. Pain experienced in the last 7 days has been a lot. Patient's pain rating has been 7/10. Patient states that he has experienced no weight loss or gain in last 6 months.     Depression Screening:   PHQ-2 Score: 0      Fall Risk Screening:   In the past year, patient has experienced: no history of falling in past year      Home Safety:  Patient does not have trouble with stairs inside or outside of their home. Patient has working smoke alarms and has working carbon monoxide detector. Home safety hazards include: none.     Nutrition:   Current diet is Diabetic and Low Cholesterol.     Medications:   Patient is not currently taking any over-the-counter supplements. Patient is able to manage medications.     Activities of Daily Living (ADLs)/Instrumental Activities of Daily Living (IADLs):   Walk and transfer into and out of bed and chair?: Yes  Dress and groom yourself?: Yes    Bathe or shower yourself?: Yes    Feed yourself? Yes  Do your laundry/housekeeping?: Yes  Manage your money, pay your bills and track your expenses?: No  Make your own meals?: Yes    Do your own shopping?: Yes    Previous Hospitalizations:   Any hospitalizations or ED visits within the last 12 months?: No      Advance Care Planning:   Living will: No    Durable POA for healthcare: No    Advanced directive: No    End of Life Decisions reviewed with patient: No      Cognitive Screening:   Provider or family/friend/caregiver concerned regarding cognition?: No    PREVENTIVE SCREENINGS      Cardiovascular Screening:    General: Screening Not Indicated and History Lipid Disorder      Diabetes Screening:     General: Screening Not Indicated and History Diabetes      Colorectal Cancer Screening:     General: Screening Current      Prostate Cancer Screening:     "General: History Prostate Cancer and Screening Not Indicated      Osteoporosis Screening:    General: Screening Not Indicated      Abdominal Aortic Aneurysm (AAA) Screening:    Risk factors include: age between 65-74 yo and tobacco use        General: Screening Not Indicated      Lung Cancer Screening:     General: Screening Not Indicated      Hepatitis C Screening:    General: Screening Current    Hep C Screening Accepted: No     Screening, Brief Intervention, and Referral to Treatment (SBIRT)    Screening  Typical number of drinks in a day: 0  Typical number of drinks in a week: 0  Interpretation: Low risk drinking behavior.    Single Item Drug Screening:  How often have you used an illegal drug (including marijuana) or a prescription medication for non-medical reasons in the past year? never    Single Item Drug Screen Score: 0  Interpretation: Negative screen for possible drug use disorder    Other Counseling Topics:   Car/seat belt/driving safety and regular weightbearing exercise.     Social Determinants of Health     Food Insecurity: No Food Insecurity (11/6/2024)    Hunger Vital Sign     Worried About Running Out of Food in the Last Year: Never true     Ran Out of Food in the Last Year: Never true   Transportation Needs: No Transportation Needs (11/6/2024)    PRAPARE - Transportation     Lack of Transportation (Medical): No     Lack of Transportation (Non-Medical): No   Housing Stability: Low Risk  (11/6/2024)    Housing Stability Vital Sign     Unable to Pay for Housing in the Last Year: No     Number of Times Moved in the Last Year: 0     Homeless in the Last Year: No   Utilities: Not At Risk (11/6/2024)    [facility] Utilities     Threatened with loss of utilities: No     No results found.    Objective     /80   Pulse 88   Temp 97.6 °F (36.4 °C)   Resp 18   Ht 5' 6\" (1.676 m)   Wt 77.1 kg (170 lb)   SpO2 97%   BMI 27.44 kg/m²     Physical Exam    "

## 2024-11-12 ENCOUNTER — TELEPHONE (OUTPATIENT)
Dept: FAMILY MEDICINE CLINIC | Facility: CLINIC | Age: 74
End: 2024-11-12

## 2024-11-13 DIAGNOSIS — B02.29 PHN (POSTHERPETIC NEURALGIA): ICD-10-CM

## 2024-11-13 RX ORDER — PREGABALIN 150 MG/1
150 CAPSULE ORAL 2 TIMES DAILY
Qty: 180 CAPSULE | Refills: 1 | Status: SHIPPED | OUTPATIENT
Start: 2024-11-13

## 2025-01-23 ENCOUNTER — TELEPHONE (OUTPATIENT)
Dept: FAMILY MEDICINE CLINIC | Facility: CLINIC | Age: 75
End: 2025-01-23

## 2025-01-23 DIAGNOSIS — E11.22 TYPE 2 DIABETES MELLITUS WITH STAGE 2 CHRONIC KIDNEY DISEASE, WITHOUT LONG-TERM CURRENT USE OF INSULIN  (HCC): ICD-10-CM

## 2025-01-23 DIAGNOSIS — N18.2 TYPE 2 DIABETES MELLITUS WITH STAGE 2 CHRONIC KIDNEY DISEASE, WITHOUT LONG-TERM CURRENT USE OF INSULIN  (HCC): ICD-10-CM

## 2025-01-23 RX ORDER — METFORMIN HYDROCHLORIDE 500 MG/1
2000 TABLET, EXTENDED RELEASE ORAL
Qty: 400 TABLET | Refills: 1 | Status: SHIPPED | OUTPATIENT
Start: 2025-01-23

## 2025-01-23 NOTE — TELEPHONE ENCOUNTER
Patient stopped in to request a refill of metformin. He states he's not sure if you still want him to take it, but if you do he needs a refill.

## 2025-02-10 ENCOUNTER — HOSPITAL ENCOUNTER (EMERGENCY)
Facility: HOSPITAL | Age: 75
Discharge: HOME | End: 2025-02-10
Payer: MEDICARE

## 2025-02-10 ENCOUNTER — RESULTS FOLLOW-UP (OUTPATIENT)
Dept: EMERGENCY DEPT | Facility: HOSPITAL | Age: 75
End: 2025-02-10

## 2025-02-10 ENCOUNTER — APPOINTMENT (EMERGENCY)
Dept: RADIOLOGY | Facility: HOSPITAL | Age: 75
End: 2025-02-10
Payer: MEDICARE

## 2025-02-10 VITALS
HEART RATE: 106 BPM | SYSTOLIC BLOOD PRESSURE: 134 MMHG | RESPIRATION RATE: 20 BRPM | TEMPERATURE: 97.8 F | OXYGEN SATURATION: 98 % | DIASTOLIC BLOOD PRESSURE: 72 MMHG

## 2025-02-10 DIAGNOSIS — S39.012A STRAIN OF LUMBAR REGION, INITIAL ENCOUNTER: ICD-10-CM

## 2025-02-10 DIAGNOSIS — M54.50 LOW BACK PAIN: Primary | ICD-10-CM
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
## 2025-02-10 PROCEDURE — 99284 EMERGENCY DEPT VISIT MOD MDM: CPT | Performed by: PHYSICIAN ASSISTANT

## 2025-02-10 PROCEDURE — 72100 X-RAY EXAM L-S SPINE 2/3 VWS: CPT

## 2025-02-10 PROCEDURE — 99284 EMERGENCY DEPT VISIT MOD MDM: CPT

## 2025-02-10 RX ORDER — ACETAMINOPHEN 325 MG/1
975 TABLET ORAL ONCE
Status: COMPLETED | OUTPATIENT
Start: 2025-02-10 | End: 2025-02-10

## 2025-02-10 RX ADMIN — ACETAMINOPHEN 975 MG: 325 TABLET ORAL at 11:42
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
## 2025-02-10 NOTE — DISCHARGE INSTRUCTIONS
Take ibuprofen 400 mg along with Tylenol 5 mg every 6 hours for the next 2 days.  Call [facility] to set up an appointment for evaluation.  Any worsening condition follow-up primary care [facility] and or return to the emergency department.

## 2025-02-13 ENCOUNTER — OFFICE VISIT (OUTPATIENT)
Dept: FAMILY MEDICINE CLINIC | Facility: CLINIC | Age: 75
End: 2025-02-13
Payer: COMMERCIAL

## 2025-02-13 VITALS
DIASTOLIC BLOOD PRESSURE: 62 MMHG | WEIGHT: 167.8 LBS | HEART RATE: 92 BPM | HEIGHT: 66 IN | SYSTOLIC BLOOD PRESSURE: 130 MMHG | BODY MASS INDEX: 26.97 KG/M2 | TEMPERATURE: 96.1 F
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
## 2025-02-13 DIAGNOSIS — N18.2 TYPE 2 DIABETES MELLITUS WITH STAGE 2 CHRONIC KIDNEY DISEASE, WITHOUT LONG-TERM CURRENT USE OF INSULIN  (HCC): ICD-10-CM

## 2025-02-13 DIAGNOSIS — E11.22 TYPE 2 DIABETES MELLITUS WITH STAGE 2 CHRONIC KIDNEY DISEASE, WITHOUT LONG-TERM CURRENT USE OF INSULIN  (HCC): ICD-10-CM

## 2025-02-13 DIAGNOSIS — M54.50 ACUTE RIGHT-SIDED LOW BACK PAIN WITHOUT SCIATICA: ICD-10-CM

## 2025-02-13 PROCEDURE — 99214 OFFICE O/P EST MOD 30 MIN: CPT | Performed by: FAMILY MEDICINE

## 2025-02-13 RX ORDER — CYCLOBENZAPRINE HCL 10 MG
10 TABLET ORAL
Qty: 21 TABLET | Refills: 0 | Status: SHIPPED | OUTPATIENT
Start: 2025-02-13
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
## 2025-02-13 NOTE — ASSESSMENT & PLAN NOTE
Back pain  Ice suggested  Re-ordered PT, he should call today  Fall risk with flexeril  F/u 2w to check response to tx vs orthopedic referral    Orders:    Ambulatory Referral to [facility]; Future    cyclobenzaprine (FLEXERIL) 10 mg tablet; Take 1 tablet (10 mg total) by mouth daily at bedtime as needed for muscle spasms May increase fall risk

## 2025-02-13 NOTE — ASSESSMENT & PLAN NOTE
Orders:    Ambulatory Referral to [facility]; Future    cyclobenzaprine (FLEXERIL) 10 mg tablet; Take 1 tablet (10 mg total) by mouth daily at bedtime as needed for muscle spasms May increase fall risk

## 2025-02-14 ENCOUNTER — TELEPHONE (OUTPATIENT)
Dept: PHYSICAL THERAPY | Facility: OTHER | Age: 75
End: 2025-02-14
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
## 2025-02-14 NOTE — ASSESSMENT & PLAN NOTE
Unable to use prednisone due to DM  Lab Results   Component Value Date    HGBA1C 8.6 (H) 09/11/2024

## 2025-02-18 ENCOUNTER — EVALUATION (OUTPATIENT)
Dept: PHYSICAL THERAPY | Facility: CLINIC | Age: 75
End: 2025-02-18
Payer: COMMERCIAL

## 2025-02-18 DIAGNOSIS — M54.41 ACUTE BILATERAL LOW BACK PAIN WITH RIGHT-SIDED SCIATICA: ICD-10-CM

## 2025-02-18 DIAGNOSIS — S39.012D STRAIN OF LUMBAR REGION, SUBSEQUENT ENCOUNTER: Primary | ICD-10-CM

## 2025-02-18 PROCEDURE — 97161 PT EVAL LOW COMPLEX 20 MIN: CPT
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
## 2025-02-24 ENCOUNTER — TELEPHONE (OUTPATIENT)
Age: 75
End: 2025-02-24
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
## 2025-02-26 ENCOUNTER — OFFICE VISIT (OUTPATIENT)
Dept: FAMILY MEDICINE CLINIC | Facility: CLINIC | Age: 75
End: 2025-02-26
Payer: MEDICARE

## 2025-02-26 VITALS
BODY MASS INDEX: 26.61 KG/M2 | WEIGHT: 165.6 LBS | HEART RATE: 100 BPM | TEMPERATURE: 97.1 F | HEIGHT: 66 IN | DIASTOLIC BLOOD PRESSURE: 60 MMHG | SYSTOLIC BLOOD PRESSURE: 120 MMHG

## 2025-02-26 DIAGNOSIS — N18.2 TYPE 2 DIABETES MELLITUS WITH STAGE 2 CHRONIC KIDNEY DISEASE, WITHOUT LONG-TERM CURRENT USE OF INSULIN  (HCC): ICD-10-CM

## 2025-02-26 DIAGNOSIS — Z12.11 COLON CANCER SCREENING: ICD-10-CM

## 2025-02-26 DIAGNOSIS — E11.22 CKD STAGE 2 DUE TO TYPE 2 DIABETES MELLITUS  (HCC): ICD-10-CM

## 2025-02-26 DIAGNOSIS — M54.50 ACUTE RIGHT-SIDED LOW BACK PAIN WITHOUT SCIATICA: Primary | ICD-10-CM

## 2025-02-26 DIAGNOSIS — E78.49 OTHER HYPERLIPIDEMIA: ICD-10-CM
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
## 2025-02-26 DIAGNOSIS — Z86.0109 PERSONAL HISTORY OF OTHER COLON POLYPS: Primary | ICD-10-CM

## 2025-02-26 DIAGNOSIS — N18.2 CKD STAGE 2 DUE TO TYPE 2 DIABETES MELLITUS  (HCC): ICD-10-CM

## 2025-02-26 DIAGNOSIS — E11.22 TYPE 2 DIABETES MELLITUS WITH STAGE 2 CHRONIC KIDNEY DISEASE, WITHOUT LONG-TERM CURRENT USE OF INSULIN  (HCC): ICD-10-CM

## 2025-02-26 PROCEDURE — 99214 OFFICE O/P EST MOD 30 MIN: CPT | Performed by: FAMILY MEDICINE

## 2025-02-26 PROCEDURE — G2211 COMPLEX E/M VISIT ADD ON: HCPCS | Performed by: FAMILY MEDICINE

## 2025-02-26 NOTE — ASSESSMENT & PLAN NOTE
Result pending  Discussed plan with pending A1c  Please do the labwork for Diabetes soon.  We want the A1c level less than 7.  If it is over 7, you may need to be offered a higher dose of the jardiance from 10mg/d to 25mg/d and then we will check the A1c again after 12 weeks and before your next visit.  If the A1c is good, then stay on your medications and we will plan to do labwork again in 12 weeks.  Lab Results   Component Value Date    HGBA1C 8.6 (H) 09/11/2024

## 2025-02-26 NOTE — PROGRESS NOTES
Name: Toan Wong      : 1950      MRN: [id number]  Encounter Provider: Porfirio Law DO  Encounter Date: 2025   Encounter department: [facility]  :  Assessment & Plan  Colon cancer screening  advised       Personal history of other colon polyps  advised  Orders:    Ambulatory referral to Gastroenterology; Future    Type 2 diabetes mellitus with stage 2 chronic kidney disease, without long-term current use of insulin  (HCC)  Result pending  Discussed plan with pending A1c  Please do the labwork for Diabetes soon.  We want the A1c level less than 7.  If it is over 7, you may need to be offered a higher dose of the jardiance from 10mg/d to 25mg/d and then we will check the A1c again after 12 weeks and before your next visit.  If the A1c is good, then stay on your medications and we will plan to do labwork again in 12 weeks.  Lab Results   Component Value Date    HGBA1C 8.6 (H) 2024            CKD stage 2 due to type 2 diabetes mellitus  (HCC)    Lab Results   Component Value Date    HGBA1C 8.6 (H) 2024   stable         Other hyperlipidemia  Stable on statin w/o myalgias              History of Present Illness   HPI  Labs pending  Plans to do soon  A1c goal aware  Urinating ok  No dysuria, gu risks aware  Exercise usually 4x/w  Tolerates meds  No gu infection sx    Review of Systems   Constitutional:  Negative for chills and fever.   Respiratory:  Negative for shortness of breath.      No family history on file.   Social History     Tobacco Use    Smoking status: Never     Passive exposure: Never    Smokeless tobacco: Never   Vaping Use    Vaping status: Never Used   Substance Use Topics    Alcohol use: Not Currently    Drug use: Never      Current Outpatient Medications   Medication Instructions    atorvastatin (LIPITOR) 20 mg, Oral, Daily    cyclobenzaprine (FLEXERIL) 10 mg, Oral, Daily at bedtime PRN, May increase fall risk    Empagliflozin (JARDIANCE) 10 mg, Oral,  "Daily    esomeprazole (NEXIUM) 20 mg, Every morning before breakfast    metFORMIN (GLUCOPHAGE-XR) 2,000 mg, Oral, Daily with dinner    pregabalin (LYRICA) 150 mg, Oral, 2 times daily    sildenafil (VIAGRA) 100 mg, Oral, As needed     >>>>>>>>  Return in about 13 weeks (around 5/28/2025).  >>>>>>>>    [POCT]  No results found for this or any previous visit (from the past 24 hours).    Visit Vitals  /60   Pulse 100   Temp (!) 97.1 °F (36.2 °C)   Ht 5' 6\" (1.676 m)   Wt 75.1 kg (165 lb 9.6 oz)   BMI 26.73 kg/m²   Smoking Status Never   BSA 1.85 m²        Objective   /60   Pulse 100   Temp (!) 97.1 °F (36.2 °C)   Ht 5' 6\" (1.676 m)   Wt 75.1 kg (165 lb 9.6 oz)   BMI 26.73 kg/m²      Physical Exam  Vitals and nursing note reviewed.   Constitutional:       General: He is not in acute distress.     Appearance: He is well-developed. He is not ill-appearing.   HENT:      Head: Normocephalic.      Right Ear: Tympanic membrane normal.      Left Ear: Tympanic membrane normal.      Nose: No congestion.      Mouth/Throat:      Mouth: Mucous membranes are moist.   Eyes:      General: No scleral icterus.     Conjunctiva/sclera: Conjunctivae normal.   Neck:      Vascular: No carotid bruit.   Cardiovascular:      Rate and Rhythm: Normal rate and regular rhythm.      Heart sounds: No murmur heard.  Pulmonary:      Effort: Pulmonary effort is normal. No respiratory distress.      Breath sounds: No wheezing.   Abdominal:      General: There is no distension.      Palpations: Abdomen is soft.      Tenderness: There is no abdominal tenderness.   Musculoskeletal:         General: No deformity.      Cervical back: Neck supple.      Right lower leg: No edema.      Left lower leg: No edema.   Skin:     General: Skin is warm and dry.      Coloration: Skin is not pale.   Neurological:      Mental Status: He is alert.      Motor: No weakness.      Gait: Gait normal.   Psychiatric:         Mood and Affect: Mood normal.         " Behavior: Behavior normal.         Thought Content: Thought content normal.

## 2025-02-26 NOTE — PATIENT INSTRUCTIONS
Please do the labwork for Diabetes soon.  We want the A1c level less than 7.  If it is over 7, you may need to be offered a higher dose of the jardiance from 10mg/d to 25mg/d and then we will check the A1c again after 12 weeks and before your next visit.  If the A1c is good, then stay on your medications and we will plan to do labwork again in 12 weeks.

## 2025-02-28 LAB
ALBUMIN SERPL-MCNC: 4.4 G/DL (ref 3.6–5.1)
ALBUMIN/GLOB SERPL: 1.5 (CALC) (ref 1–2.5)
ALP SERPL-CCNC: 63 U/L (ref 35–144)
ALT SERPL-CCNC: 23 U/L (ref 9–46)
AST SERPL-CCNC: 18 U/L (ref 10–35)
BILIRUB SERPL-MCNC: 0.6 MG/DL (ref 0.2–1.2)
BUN SERPL-MCNC: 15 MG/DL (ref 7–25)
BUN/CREAT SERPL: ABNORMAL (CALC) (ref 6–22)
CALCIUM SERPL-MCNC: 9.4 MG/DL (ref 8.6–10.3)
CHLORIDE SERPL-SCNC: 102 MMOL/L (ref 98–110)
CO2 SERPL-SCNC: 31 MMOL/L (ref 20–32)
CREAT SERPL-MCNC: 1.06 MG/DL (ref 0.7–1.28)
GFR/BSA.PRED SERPLBLD CYS-BASED-ARV: 74 ML/MIN/1.73M2
GLOBULIN SER CALC-MCNC: 3 G/DL (CALC) (ref 1.9–3.7)
GLUCOSE SERPL-MCNC: 149 MG/DL (ref 65–99)
HBA1C MFR BLD: 7 % OF TOTAL HGB
POTASSIUM SERPL-SCNC: 4.3 MMOL/L (ref 3.5–5.3)
PROT SERPL-MCNC: 7.4 G/DL (ref 6.1–8.1)
SODIUM SERPL-SCNC: 140 MMOL/L (ref 135–146)

## 2025-03-01 ENCOUNTER — RESULTS FOLLOW-UP (OUTPATIENT)
Dept: FAMILY MEDICINE CLINIC | Facility: CLINIC | Age: 75
End: 2025-03-01

## 2025-03-03 ENCOUNTER — TELEPHONE (OUTPATIENT)
Dept: FAMILY MEDICINE CLINIC | Facility: CLINIC | Age: 75
End: 2025-03-03

## 2025-03-03 NOTE — TELEPHONE ENCOUNTER
Patient dropped off insurance form for you to complete. Please call when ready. Placed in your folder. Needs ASAP.

## 2025-03-04 NOTE — TELEPHONE ENCOUNTER
Form completed, please copy and inform patient it is ready for . Form given to  to copy and call patient.

## 2025-03-11 ENCOUNTER — OFFICE VISIT (OUTPATIENT)
Dept: PHYSICAL THERAPY | Facility: CLINIC | Age: 75
End: 2025-03-11
Payer: COMMERCIAL

## 2025-03-11 DIAGNOSIS — M54.41 ACUTE BILATERAL LOW BACK PAIN WITH RIGHT-SIDED SCIATICA: Primary | ICD-10-CM

## 2025-03-11 DIAGNOSIS — S39.012D STRAIN OF LUMBAR REGION, SUBSEQUENT ENCOUNTER: ICD-10-CM

## 2025-03-11 PROCEDURE — 97112 NEUROMUSCULAR REEDUCATION: CPT

## 2025-03-11 PROCEDURE — 97140 MANUAL THERAPY 1/> REGIONS: CPT

## 2025-03-11 NOTE — PROGRESS NOTES
Daily Note     Today's date: 3/11/2025  Patient name: Toan Wong  : 1950  MRN: [id number]  Referring provider: Bello Turcios PA-C  Dx:   Encounter Diagnosis     ICD-10-CM    1. Acute bilateral low back pain with right-sided sciatica  M54.41       2. Strain of lumbar region, subsequent encounter  S39.012D                      Subjective: Reports lumbar pain on both sides that is currently at a 7/10, cervical pain as well      Objective: See treatment diary below      Assessment: Tolerated treatment fair. Patient would benefit from continued PT in order to progress directional preference and work on core and postural stabilization and strengthening. Small sxs decrease with repeated prone press ups. Verbal cueing for TA engagement with exercises.       Plan: Continue per plan of care.      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     upper/lower    Stroke-like symptom 2022    Vertigo     Wears glasses     for reading         Insurance Eval/ Re-eval POC expires Auth Status Total visits  Start date  Expiration date Misc   Auto 2/18 4/15 Approved Units  [id number] - 12 units  $0               Date 2025 3/11/25       Visit Number IE    FOTO    Auth                  Manual         P-A mobs                                    Neuro Re-ed         TrA progression  performed       Hip add squeeze         Clamshells   15x ea bl       Bridge   +TA engagement 10 +ADD squeeze 10       Sciatic nerve sliders                  Thera Ex         VANDA/REIL REIL 10  ADÁN x2' Prone press up 2*10       TB shoulder ext  Prone HOB raised 1 notch 3 min       TB shoulder row  Prone press up with therapist overpressure 2*5       Paloff press   TA engagement       Anti-rotation press                                    Thera Activity          Patient education 10' with patient         Lifting mechanics         Posture education                                                      Modalities
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
## 2025-03-21 ENCOUNTER — OFFICE VISIT (OUTPATIENT)
Dept: PHYSICAL THERAPY | Facility: CLINIC | Age: 75
End: 2025-03-21
Payer: COMMERCIAL

## 2025-03-21 DIAGNOSIS — E78.49 OTHER HYPERLIPIDEMIA: ICD-10-CM

## 2025-03-21 DIAGNOSIS — S39.012D STRAIN OF LUMBAR REGION, SUBSEQUENT ENCOUNTER: ICD-10-CM

## 2025-03-21 DIAGNOSIS — M54.41 ACUTE BILATERAL LOW BACK PAIN WITH RIGHT-SIDED SCIATICA: Primary | ICD-10-CM

## 2025-03-21 PROCEDURE — 97112 NEUROMUSCULAR REEDUCATION: CPT

## 2025-03-21 PROCEDURE — 97110 THERAPEUTIC EXERCISES: CPT

## 2025-03-21 PROCEDURE — 97140 MANUAL THERAPY 1/> REGIONS: CPT

## 2025-03-21 RX ORDER — ATORVASTATIN CALCIUM 20 MG/1
20 TABLET, FILM COATED ORAL DAILY
Qty: 90 TABLET | Refills: 1 | Status: SHIPPED | OUTPATIENT
Start: 2025-03-21

## 2025-03-21 NOTE — PROGRESS NOTES
Daily Note     Today's date: 3/21/2025  Patient name: Toan Wong  : 1950  MRN: [id number]  Referring provider: Bello Turcios PA-C  Dx:   Encounter Diagnosis     ICD-10-CM    1. Acute bilateral low back pain with right-sided sciatica  M54.41       2. Strain of lumbar region, subsequent encounter  S39.012D           Start Time: 1015  Stop Time: 1100  Total time in clinic (min): 45 minutes    Subjective: Reports lower back pain is a little better, neck and left shoulder hurt      Objective: See treatment diary below      Assessment: Tolerated treatment well. Patient would benefit from continued PT in order to build core and postural strength. Verbal and tactile cueing needed for TA engagement. Did well with new strengthening exercises. Given GTB for HEP.       Plan: Continue per plan of care.      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     upper/lower    Stroke-like symptom 2022    Vertigo     Wears glasses     for reading         Insurance Eval/ Re-eval POC expires Auth Status Total visits  Start date  Expiration date Misc   Auto 2/18 4/15 Approved Units  [id number] - 12 units  $0               Date 2025 3/11/25 3/21/25      Visit Number IE 2/12 3/12  FOTO    Auth                  Manual         P-A mobs   TPR bl erector spinae, glute med, upper traps                                 Neuro Re-ed         TrA progression  performed performed      Hip add squeeze         Clamshells   15x ea bl 15x  GTB  bl      Bridge   +TA engagement 10 +ADD squeeze 10 20x + ADD squeeze      Sciatic nerve sliders   TA + SLR 2 * 10 b/l               Thera Ex         VANDA/REIL REIL 10  ADÁN x2' Prone press up 2*10       TB shoulder ext  Prone HOB raised 1 notch 3 min       TB shoulder row  Prone press up with therapist overpressure 2*5 VANDA  3*10      Paloff press   TA engagement       Anti-rotation press   Standing rows 2*10 GTB         Standing arm ext 2*10 GTB         Paloff press GTB 15x b/l               Thera Activity         Patient education 10' with patient         Lifting mechanics         Posture education                                                      Modalities

## 2025-03-25 ENCOUNTER — OFFICE VISIT (OUTPATIENT)
Dept: PHYSICAL THERAPY | Facility: CLINIC | Age: 75
End: 2025-03-25
Payer: COMMERCIAL

## 2025-03-25 DIAGNOSIS — S39.012D STRAIN OF LUMBAR REGION, SUBSEQUENT ENCOUNTER: ICD-10-CM

## 2025-03-25 DIAGNOSIS — M54.41 ACUTE BILATERAL LOW BACK PAIN WITH RIGHT-SIDED SCIATICA: Primary | ICD-10-CM

## 2025-03-25 PROCEDURE — 97112 NEUROMUSCULAR REEDUCATION: CPT | Performed by: PHYSICAL THERAPIST

## 2025-03-25 PROCEDURE — 97110 THERAPEUTIC EXERCISES: CPT | Performed by: PHYSICAL THERAPIST

## 2025-03-25 NOTE — PROGRESS NOTES
Daily Note     Today's date: 3/25/2025  Patient name: Toan Wong  : 1950  MRN: [id number]  Referring provider: Bello Turcios PA-C  Dx:   Encounter Diagnosis     ICD-10-CM    1. Acute bilateral low back pain with right-sided sciatica  M54.41       2. Strain of lumbar region, subsequent encounter  S39.012D             Start Time: 1015  Stop Time: 1053  Total time in clinic (min): 38 minutes    Subjective: Patient reports minimal pain today.       Objective: See treatment diary below      Assessment: Tolerated treatment well. Patient is progressing well at this time, with no increased pain noted throughout the course of the session. He is able to perform all closed chain activities with good form. Will continue to progress as tolerated. Patient would benefit from continued PT       Plan: Continue per plan of care.      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     upper/lower    Stroke-like symptom 2022    Vertigo     Wears glasses     for reading         Insurance Eval/ Re-eval POC expires Auth Status Total visits  Start date  Expiration date Misc   Auto 2/18 4/15 Approved Units  [id number] - 12 units  $0      TE 12          Manual 12                   Date 2025 3/11/25 3/21/25 3/25/25     Visit Number IE    FOTO    Auth         Manual 12  11 10 10     TE 12   12 11 9     Neuro re-ed 12  10 9 8                       Manual         P-A mobs   TPR bl erector spinae, glute med, upper traps                                 Neuro Re-ed         TrA progression  performed performed      Hip burners    X15 B     Clamshells   15x ea bl 15x  GTB  bl 2x10 B      Bridge   +TA engagement 10 +ADD squeeze 10 20x + ADD squeeze 2x10     With add squeeze 2x10     Sciatic nerve sliders   TA + SLR 2 * 10 b/l TA + SLR 2 * 10 b/l               Thera Ex         VANDA/REIL REIL 10  ADÁN x2' Prone press up 2*10       TB shoulder ext  Prone HOB raised 1 notch 3 min       TB shoulder row  Prone press up with therapist overpressure 2*5 VANDA 3*10 VANDA 3*10     Paloff press   TA engagement       Anti-rotation press   Standing rows 2*10 GTB Standing rows 2*10 GTB        Standing arm ext 2*10 GTB Standing arm ext 2*10 GTB        Paloff press GTB 15x b/l Paloff press GTB 15x b/l     TRX squat to chair    2x10      Thera Activity         Patient education 10' with patient         Lifting mechanics         Posture education                                                      Modalities

## 2025-03-28 ENCOUNTER — OFFICE VISIT (OUTPATIENT)
Dept: PHYSICAL THERAPY | Facility: CLINIC | Age: 75
End: 2025-03-28
Payer: COMMERCIAL

## 2025-03-28 DIAGNOSIS — S39.012D STRAIN OF LUMBAR REGION, SUBSEQUENT ENCOUNTER: ICD-10-CM

## 2025-03-28 DIAGNOSIS — M54.41 ACUTE BILATERAL LOW BACK PAIN WITH RIGHT-SIDED SCIATICA: Primary | ICD-10-CM

## 2025-03-28 PROCEDURE — 97112 NEUROMUSCULAR REEDUCATION: CPT | Performed by: PHYSICAL THERAPIST

## 2025-03-28 PROCEDURE — 97110 THERAPEUTIC EXERCISES: CPT | Performed by: PHYSICAL THERAPIST

## 2025-03-28 NOTE — PROGRESS NOTES
Daily Note     Today's date: 3/28/2025  Patient name: Toan Wong  : 1950  MRN: [id number]  Referring provider: Bello Turcios PA-C  Dx:   Encounter Diagnosis     ICD-10-CM    1. Acute bilateral low back pain with right-sided sciatica  M54.41       2. Strain of lumbar region, subsequent encounter  S39.012D               Start Time: 1010  Stop Time: 1050  Total time in clinic (min): 40 minutes    Subjective: Patient has no new complaints today.        Objective: See treatment diary below      Assessment: Tolerated treatment well. Patient demonstrates good form during today's session, with no increased pain noted. Will continue to progress core strength as tolerated. Patient would benefit from continued PT       Plan: Continue per plan of care.      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     upper/lower    Stroke-like symptom 2022    Vertigo     Wears glasses     for reading         Insurance Eval/ Re-eval POC expires Auth Status Total visits  Start date  Expiration date Misc   Auto 2/18 4/15 Approved Units  [id number] - 12 units  $0      TE 12          Manual 12                   Date 2025 3/11/25 3/21/25 3/25/25 3/2/25    Visit Number IE    FOTO    Auth         Manual 12  11 10 10     TE 12   12 11 9     Neuro re-ed 12  10 9 8                       Manual         P-A mobs   TPR bl erector spinae, glute med, upper traps                                 Neuro Re-ed         TrA progression  performed performed      Hip burners    X15 B X15 B    Clamshells   15x ea bl 15x  GTB  bl 2x10 B  2x10 B    Bridge   +TA engagement 10 +ADD squeeze 10 20x + ADD squeeze 2x10     With add squeeze 2x10 2x10     With add squeeze 2x10    Sciatic nerve sliders   TA + SLR 2 * 10 b/l TA + SLR 2 * 10 b/l TA + SLR 2 * 10 b/l             Thera  Ex         VANDA/REIL REIL 10  ADÁN x2' Prone press up 2*10       TB shoulder ext  Prone HOB raised 1 notch 3 min   Backwards CC walking x10 12.5#    TB shoulder row  Prone press up with therapist overpressure 2*5 VANDA 3*10 VANDA 3*10 VANDA 3*10    Paloff press   TA engagement   X band walk 6x10' BTB    Anti-rotation press   Standing rows 2*10 GTB Standing rows 2*10 GTB Standing rows 2*10 GTB       Standing arm ext 2*10 GTB Standing arm ext 2*10 GTB Standing arm ext 2*10 GTB       Paloff press GTB 15x b/l Paloff press GTB 15x b/l Paloff press GTB 15x b/l    TRX squat to chair    2x10  2x10    Thera Activity         Patient education 10' with patient         Lifting mechanics         Posture education                                                      Modalities

## 2025-04-04 ENCOUNTER — OFFICE VISIT (OUTPATIENT)
Dept: PHYSICAL THERAPY | Facility: CLINIC | Age: 75
End: 2025-04-04
Payer: COMMERCIAL

## 2025-04-04 DIAGNOSIS — S39.012D STRAIN OF LUMBAR REGION, SUBSEQUENT ENCOUNTER: ICD-10-CM

## 2025-04-04 DIAGNOSIS — M54.41 ACUTE BILATERAL LOW BACK PAIN WITH RIGHT-SIDED SCIATICA: Primary | ICD-10-CM

## 2025-04-04 PROCEDURE — 97110 THERAPEUTIC EXERCISES: CPT | Performed by: PHYSICAL THERAPIST

## 2025-04-04 PROCEDURE — 97112 NEUROMUSCULAR REEDUCATION: CPT | Performed by: PHYSICAL THERAPIST

## 2025-04-04 NOTE — PROGRESS NOTES
Daily Note     Today's date: 2025  Patient name: Toan Wong  : 1950  MRN: [id number]  Referring provider: Bello Turcios PA-C  Dx:   Encounter Diagnosis     ICD-10-CM    1. Acute bilateral low back pain with right-sided sciatica  M54.41       2. Strain of lumbar region, subsequent encounter  S39.012D               Start Time: 1000  Stop Time: 1045  Total time in clinic (min): 45 minutes    Subjective: Patient reporting pain R side LB 6/10. Pt states compliance with HEP.        Objective: See treatment diary below      Assessment: Tolerated treatment well. Patient demonstrates good form during today's session, with no increased pain noted post tx. Pt does require Vcs to maintain TrA with all therex. Emphasis on neuro re-education core stabilization with all therex. Cues required for proper breathing with exertion. Pt will continue to progress core strength as tolerated per primary PT.  Patient would benefit from continued PT       Plan: Continue per plan of care.  Progress as per primary PT.      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     upper/lower    Stroke-like symptom 2022    Vertigo     Wears glasses     for reading         Insurance Eval/ Re-eval POC expires Auth Status Total visits  Start date  Expiration date Misc   Auto 2/18 4/15 Approved Units  [id number] - 12 units  $0      TE 12          Manual 12                   Date 2025 3/11/25 3/21/25 3/25/25 3/2/25 4/4   Visit Number IE    FOTO    Auth         Manual 12  11 10 10     TE 12   12 11 9     Neuro re-ed 12  10 9 8                       Manual         P-A mobs   TPR bl erector spinae, glute med, upper traps                                 Neuro Re-ed         TrA progression  performed performed      Hip burners    X15 B X15 B    Clamshells   15x ea  bl 15x  GTB  bl 2x10 B  2x10 B 2x10 hold 5   Bridge   +TA engagement 10 +ADD squeeze 10 20x + ADD squeeze 2x10     With add squeeze 2x10 2x10     With add squeeze 2x10 2x10 hold 5 with add squeeze iso   Sciatic nerve sliders   TA + SLR 2 * 10 b/l TA + SLR 2 * 10 b/l TA + SLR 2 * 10 b/l 2x10 with 5 ankle pumps ea         Hamstring stretch SOS x 10 hold 10 ea leg   Thera Ex         VANDA/REIL REIL 10  ADÁN x2' Prone press up 2*10       TB shoulder ext  Prone HOB raised 1 notch 3 min   Backwards CC walking x10 12.5# Backwards walking CC x20 12.5#   TB shoulder row  Prone press up with therapist overpressure 2*5 VANDA 3*10 VANDA 3*10 VANDA 3*10 VANDA 3x10   Paloff press   TA engagement   X band walk 6x10' BTB Green TB doubled 2x10 hold 5  ea side palloff press   Anti-rotation press   Standing rows 2*10 GTB Standing rows 2*10 GTB Standing rows 2*10 GTB Standing rows GTB 2x10      Standing arm ext 2*10 GTB Standing arm ext 2*10 GTB Standing arm ext 2*10 GTB Standing arm ext 2x10 GTB      Paloff press GTB 15x b/l Paloff press GTB 15x b/l Paloff press GTB 15x b/l -   TRX squat to chair    2x10  2x10 -   Thera Activity         Patient education 10' with patient         Lifting mechanics         Posture education                                                      Modalities

## 2025-04-08 ENCOUNTER — OFFICE VISIT (OUTPATIENT)
Dept: PHYSICAL THERAPY | Facility: CLINIC | Age: 75
End: 2025-04-08
Payer: COMMERCIAL

## 2025-04-08 DIAGNOSIS — S39.012D STRAIN OF LUMBAR REGION, SUBSEQUENT ENCOUNTER: ICD-10-CM

## 2025-04-08 DIAGNOSIS — M54.41 ACUTE BILATERAL LOW BACK PAIN WITH RIGHT-SIDED SCIATICA: Primary | ICD-10-CM

## 2025-04-08 PROCEDURE — 97112 NEUROMUSCULAR REEDUCATION: CPT | Performed by: PHYSICAL THERAPIST

## 2025-04-08 PROCEDURE — 97110 THERAPEUTIC EXERCISES: CPT | Performed by: PHYSICAL THERAPIST

## 2025-04-08 NOTE — PROGRESS NOTES
Daily Note     Today's date: 2025  Patient name: Toan Wong  : 1950  MRN: [id number]  Referring provider: Bello Turcios PA-C  Dx:   Encounter Diagnosis     ICD-10-CM    1. Acute bilateral low back pain with right-sided sciatica  M54.41       2. Strain of lumbar region, subsequent encounter  S39.012D                 Start Time: 1005  Stop Time: 1045  Total time in clinic (min): 40 minutes    Subjective: Patient has minimal pain of his back today.         Objective: See treatment diary below      Assessment: Tolerated treatment well. Patient reports minimal pain during today's session. He reports soreness of B LE post tx. Will continue to progress as tolerated. Patient would benefit from continued PT       Plan: Continue per plan of care.       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     upper/lower    Stroke-like symptom 2022    Vertigo     Wears glasses     for reading         Insurance Eval/ Re-eval POC expires Auth Status Total visits  Start date  Expiration date Misc   Auto 2/18 4/15 Approved Units  [id number] - 12 units  $0      TE 12          Manual 12                   Date 2025 3/11/25 3/21/25 3/25/25 3/2/25 4/4 4/8/25   Visit Number IE    FOTO     Auth          Manual 12  11 10 10 10 10 10   TE 12   12 11 9 7 6 4   Neuro re-ed 12  10 9 8 7 5 4                       Manual          P-A mobs   TPR bl erector spinae, glute med, upper traps                                     Neuro Re-ed          TrA progression  performed performed    Modified plantigrade alt arm/leg 2x10 B    Hip burners    X15 B X15 B  X15 B    Clamshells   15x ea bl 15x  GTB  bl 2x10 B  2x10 B 2x10 hold 5    Bridge   +TA engagement 10 +ADD squeeze 10 20x + ADD squeeze 2x10     With add squeeze 2x10 2x10     With add squeeze 2x10 2x10 hold  5 with add squeeze iso 2x10 hold 5 with add squeeze iso   Sciatic nerve sliders   TA + SLR 2 * 10 b/l TA + SLR 2 * 10 b/l TA + SLR 2 * 10 b/l 2x10 with 5 ankle pumps ea TA + SLR 2 * 10 b/l         Hamstring stretch SOS x 10 hold 10 ea leg    Thera Ex          VANDA/REIL REIL 10  ADÁN x2' Prone press up 2*10     X band walk 6x10' BTB   TB shoulder ext  Prone HOB raised 1 notch 3 min   Backwards CC walking x10 12.5# Backwards walking CC x20 12.5# Hip abd, ext at wall x10 B    TB shoulder row  Prone press up with therapist overpressure 2*5 VANDA 3*10 VANDA 3*10 VANDA 3*10 VANDA 3x10 VANDA 3x10   Paloff press   TA engagement   X band walk 6x10' BTB Green TB doubled 2x10 hold 5  ea side palloff press X15 B purple tubing    Anti-rotation press   Standing rows 2*10 GTB Standing rows 2*10 GTB Standing rows 2*10 GTB Standing rows GTB 2x10 Standing row purple tubing 2x10       Standing arm ext 2*10 GTB Standing arm ext 2*10 GTB Standing arm ext 2*10 GTB Standing arm ext 2x10 GTB       Paloff press GTB 15x b/l Paloff press GTB 15x b/l Paloff press GTB 15x b/l - Suitcase carries 4x50' 18#   TRX squat to chair    2x10  2x10 -    Thera Activity          Patient education 10' with patient          Lifting mechanics          Posture education                                                            Modalities

## 2025-04-11 ENCOUNTER — OFFICE VISIT (OUTPATIENT)
Dept: PHYSICAL THERAPY | Facility: CLINIC | Age: 75
End: 2025-04-11
Attending: PHYSICIAN ASSISTANT
Payer: COMMERCIAL

## 2025-04-11 DIAGNOSIS — M54.41 ACUTE BILATERAL LOW BACK PAIN WITH RIGHT-SIDED SCIATICA: Primary | ICD-10-CM

## 2025-04-11 DIAGNOSIS — S39.012D STRAIN OF LUMBAR REGION, SUBSEQUENT ENCOUNTER: ICD-10-CM

## 2025-04-11 PROCEDURE — 97110 THERAPEUTIC EXERCISES: CPT | Performed by: PHYSICAL THERAPIST

## 2025-04-11 PROCEDURE — 97112 NEUROMUSCULAR REEDUCATION: CPT | Performed by: PHYSICAL THERAPIST

## 2025-04-11 NOTE — PROGRESS NOTES
"Daily Note     Today's date: 2025  Patient name: Toan Wong  : 1950  MRN: [id number]  Referring provider: Bello Turcios PA-C  Dx:   Encounter Diagnosis     ICD-10-CM    1. Acute bilateral low back pain with right-sided sciatica  M54.41       2. Strain of lumbar region, subsequent encounter  S39.012D                 Start Time: 1000  Stop Time: 1048  Total time in clinic (min): 48 minutes    Subjective: Patient has minimal pain of his back today. \"I feel good\"       Objective: See treatment diary below      Assessment: Tolerated treatment well. Emphasis of treatment on neuro re-education of core stabilizers with all therex. Pt reports no pain post tx. Will continue to progress as tolerated. Patient would benefit from continued PT. Progress pt as per primary PT      Plan: Continue per plan of care.  Progress as per primary PT.      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     upper/lower    Stroke-like symptom 2022    Vertigo     Wears glasses     for reading         Insurance Eval/ Re-eval POC expires Auth Status Total visits  Start date  Expiration date Misc   Auto 2/18 4/15 Approved Units  [id number] - 12 units  $0      TE 12          Manual 12                   Date 3/25/25 3/2/25 4/4 4/8/25 4/11/25   Visit Number  FOTO      Auth        Manual 12 10 10 10 10 10   TE 12  9 7 6 4 3   Neuro re-ed 12 8 7 5 4 2                   Manual        P-A mobs                                Neuro Re-ed        TrA progression    Modified plantigrade alt arm/leg 2x10 B     Hip burners X15 B X15 B  X15 B  X10 B hold 5 iso   Clamshells  2x10 B  2x10 B 2x10 hold 5  2x10 hold 5 ea with ankle add iso ball squeeze   Bridge  2x10     With add squeeze 2x10 2x10     With add squeeze 2x10 2x10 hold 5 with add squeeze iso 2x10 hold 5 with add " squeeze iso 3x10 hold 5 with add squeeze iso    Sciatic nerve sliders TA + SLR 2 * 10 b/l TA + SLR 2 * 10 b/l 2x10 with 5 ankle pumps ea TA + SLR 2 * 10 b/l SLR 2x10 wth TA      Hamstring stretch SOS x 10 hold 10 ea leg  Prone on elbows x10 hold 3   Thera Ex        VANDA/REIL    X band walk 6x10' BTB CC 12.5# 2x10    TB shoulder ext  Backwards CC walking x10 12.5# Backwards walking CC x20 12.5# Hip abd, ext at wall x10 B  Hip abd, ext at wall x10 B    TB shoulder row VANDA 3*10 VANDA 3*10 VANDA 3x10 VANDA 3x10 VANDA 3x10   Paloff press   X band walk 6x10' BTB Green TB doubled 2x10 hold 5  ea side palloff press X15 B purple tubing     Anti-rotation press Standing rows 2*10 GTB Standing rows 2*10 GTB Standing rows GTB 2x10 Standing row purple tubing 2x10  Standing rows green TB 2x10 hold 5 iso    Standing arm ext 2*10 GTB Standing arm ext 2*10 GTB Standing arm ext 2x10 GTB      Paloff press GTB 15x b/l Paloff press GTB 15x b/l - Suitcase carries 4x50' 18# Suitcase carries 4x50' 18# ea hand   TRX squat to chair 2x10  2x10 -     Thera Activity        Patient education        Lifting mechanics        Posture education                                                Modalities

## 2025-04-15 ENCOUNTER — OFFICE VISIT (OUTPATIENT)
Dept: PHYSICAL THERAPY | Facility: CLINIC | Age: 75
End: 2025-04-15
Payer: COMMERCIAL

## 2025-04-15 DIAGNOSIS — S39.012D STRAIN OF LUMBAR REGION, SUBSEQUENT ENCOUNTER: ICD-10-CM

## 2025-04-15 DIAGNOSIS — M54.41 ACUTE BILATERAL LOW BACK PAIN WITH RIGHT-SIDED SCIATICA: Primary | ICD-10-CM

## 2025-04-15 PROCEDURE — 97110 THERAPEUTIC EXERCISES: CPT | Performed by: PHYSICAL THERAPIST

## 2025-04-15 NOTE — PROGRESS NOTES
Daily Note     Today's date: 4/15/2025  Patient name: Toan Wong  : 1950  MRN: [id number]  Referring provider: Bello Turcios PA-C  Dx:   Encounter Diagnosis     ICD-10-CM    1. Acute bilateral low back pain with right-sided sciatica  M54.41       2. Strain of lumbar region, subsequent encounter  S39.012D                   Start Time: 1000  Stop Time: 1040  Total time in clinic (min): 40 minutes    Subjective: Patient has no complaints of his lumbar spine pain today.       Objective: See treatment diary below      Assessment: Tolerated treatment well. Patient is progressing well at this time, with no increased pain noted throughout the session. He continues to require cueing for proper form during paloff press, but able to correct. Will continue to challenge as appropriate. Patient would benefit from continued PT.       Plan: Continue per plan of care.       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     upper/lower    Stroke-like symptom 2022    Vertigo     Wears glasses     for reading         Insurance Eval/ Re-eval POC expires Auth Status Total visits  Start date  Expiration date Misc   Auto 2/18 4/15 Approved Units  [id number] - 12 units  $0      TE 12          Manual 12                   Date 3/25/25 3/2/25 4/4 4/8/25 4/11/25 4/15/25   Visit Number  FOTO       Auth         Manual 12 10 10 10 10 10 10   TE 12  9 7 6 4 3 2   Neuro re-ed 12 8 7 5 4 2 2                     Manual         P-A mobs                                    Neuro Re-ed         TrA progression    Modified plantigrade alt arm/leg 2x10 B      Hip burners X15 B X15 B  X15 B  X10 B hold 5 iso X20 B    Clamshells  2x10 B  2x10 B 2x10 hold 5  2x10 hold 5 ea with ankle add iso ball squeeze Hooklying 2x10 BTB   Bridge  2x10     With add squeeze 2x10 2x10      With add squeeze 2x10 2x10 hold 5 with add squeeze iso 2x10 hold 5 with add squeeze iso 3x10 hold 5 with add squeeze iso  3x10 hold 5 with add squeeze iso    Sciatic nerve sliders TA + SLR 2 * 10 b/l TA + SLR 2 * 10 b/l 2x10 with 5 ankle pumps ea TA + SLR 2 * 10 b/l SLR 2x10 wth TA SLR 2x10 wth TA      Hamstring stretch SOS x 10 hold 10 ea leg  Prone on elbows x10 hold 3    Thera Ex         VANDA/REIL    X band walk 6x10' BTB CC 12.5# 2x10     TB shoulder ext  Backwards CC walking x10 12.5# Backwards walking CC x20 12.5# Hip abd, ext at wall x10 B  Hip abd, ext at wall x10 B  Hip abd, ext at wall x10 B on foam   TB shoulder row VANDA 3*10 VANDA 3*10 VANDA 3x10 VANDA 3x10 VANDA 3x10 VANDA 3x10   Paloff press   X band walk 6x10' BTB Green TB doubled 2x10 hold 5  ea side palloff press X15 B purple tubing   X15 B purple tubing    Anti-rotation press Standing rows 2*10 GTB Standing rows 2*10 GTB Standing rows GTB 2x10 Standing row purple tubing 2x10  Standing rows green TB 2x10 hold 5 iso Standing row purple tubing 2x10     Standing arm ext 2*10 GTB Standing arm ext 2*10 GTB Standing arm ext 2x10 GTB   Standing arm ext 2x10 GTB    Paloff press GTB 15x b/l Paloff press GTB 15x b/l - Suitcase carries 4x50' 18# Suitcase carries 4x50' 18# ea hand Suitcase carries 4x50' 18# ea hand   TRX squat to chair 2x10  2x10 -   STS to chair 18# 2x5   Thera Activity         Patient education         Lifting mechanics         Posture education                                                      Modalities

## 2025-04-18 ENCOUNTER — OFFICE VISIT (OUTPATIENT)
Dept: PHYSICAL THERAPY | Facility: CLINIC | Age: 75
End: 2025-04-18
Payer: COMMERCIAL

## 2025-04-18 ENCOUNTER — TELEPHONE (OUTPATIENT)
Dept: FAMILY MEDICINE CLINIC | Facility: CLINIC | Age: 75
End: 2025-04-18
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
## 2025-04-18 DIAGNOSIS — S39.012D STRAIN OF LUMBAR REGION, SUBSEQUENT ENCOUNTER: ICD-10-CM

## 2025-04-18 DIAGNOSIS — M54.41 ACUTE BILATERAL LOW BACK PAIN WITH RIGHT-SIDED SCIATICA: Primary | ICD-10-CM

## 2025-04-18 DIAGNOSIS — M54.50 ACUTE RIGHT-SIDED LOW BACK PAIN WITHOUT SCIATICA: Primary | ICD-10-CM

## 2025-04-18 PROCEDURE — 97112 NEUROMUSCULAR REEDUCATION: CPT | Performed by: PHYSICAL THERAPIST

## 2025-04-18 PROCEDURE — 97110 THERAPEUTIC EXERCISES: CPT | Performed by: PHYSICAL THERAPIST

## 2025-04-18 NOTE — PROGRESS NOTES
Progress Note     Today's date: 2025  Patient name: Toan Wong  : 1950  MRN: [id number]  Referring provider: Bello Turcios PA-C  Dx:   Encounter Diagnosis     ICD-10-CM    1. Acute bilateral low back pain with right-sided sciatica  M54.41       2. Strain of lumbar region, subsequent encounter  S39.012D                     Start Time: 1000  Stop Time: 1045  Total time in clinic (min): 45 minutes    Subjective: Patient is a little sore today. He feels like he is making progress, but continues to have back pain when he is bending and lifting objects of weight. He reports 4/10 pain of his back today. Overall, reports 50% improvement in function since starting PT intervention.       Objective: See treatment diary below    Goals  Short Term Goals (4 weeks):  MET as of 25  - Patient will be independent in basic HEP 2-3 weeks  - Patient will have 0/10 pain at rest  - Patient will demonstrate >1/3 improvement in MMT grade as applicable  - Patient functional goal: Patient will stand and walk >30 minutes with no lumbar spine pain.     Long Term Goals (8 weeks): Progressing towards as of 25   - Patient will be independent in a comprehensive home exercise program  - Patient FOTO score will improve by 10 points.   - Patient will self-report >75% improvement in function  - Patient functional goal : patient will perform household activities with no pain.        LE MMT    L Hip Flexion: 4/5  R Hip Flexion: 4/5   L Hip Extension: 4/5 R Hip Extension: 4/5   L Hip Abduction: 3+/5 R Hip Abduction: 4-/5   L Hip Adduction: 4/5 R Hip Adduction: 4/5   L Knee Extension: 4/5 R Knee Extension: 4/5   L Knee Flexion: 4/5 R Knee Flexion: 4/5   L Ankle DF: 4/5 R Ankle DF: 4/5   L Ankle PF: 4-/5  R Ankle PF: 4-/5         Movement Loss Mohit Mod Min Nil Symptoms   Flexion   x  + pain   Extension   x  + pain   Side gliding R   x  + strain   Side gliding L   x  + strain   R rotation   x  + strain   L rotation   x  +  strain      Symptom response Mechanical Response    During testing After testing Effect (?? ROM or key functional test) No effect   Pretest symptoms in standing       FIS       RFIS       EIS Decreased  Better  Improved flexion    VANDA Decreased  Better  Improved flexion           Pretest symptoms lying       BRANDIE       RFIL       EIL Decreased  Better  Improved flexion    REIL Decreased  Better  Improved flexion            Pretest symptoms       R SGIS       R RSGIS       L SGIS       L RSGIS              Other movements             Sensation  - Light touch: intact    Palpation   -TTP B PSIS, lumbar paraspinals      Special Testing L R   FABIR negative negative   FADIR negative negative   Hip Scour  negative negative   Thigh thrust negative negative   Flick test  negative negative   Standing flexion test  negative negative   Prone knee flexion test negative negative   Supine to long sit test negative negative   Neurodynamic assessment  NT NT       Assessment: Tolerated treatment well. Patient continues to demonstrate improvements in his overall function since starting PT intervention. He continues to experience increased pain when he is performing lifting activities, twisting activities, and bending forward. Despite this, ROM has improved with pain noted at end range. Patient would benefit from continued PT to decrease pain, improve function and improve overall function for ease with household activities.       Plan: Continue per plan of care.  Continue with skilled PT intervention 1-2x/week for 4-6 weeks to decrease pain, improve mobility, and transition to HEP.      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     upper/lower    Stroke-like symptom 04/05/2022    Vertigo     Wears glasses     for reading         Insurance Eval/ Re-eval POC expires Auth  Status Total visits  Start date  Expiration date Misc   Auto 2/18 4/15 Approved Units  [id number] - 12 units 2/26 4/23 $0      TE 12          Manual 12                   Date 3/25/25 3/2/25 4/4 4/8/25 4/11/25 4/15/25 4/18/25   Visit Number  FOTO        Auth          Manual 12 10 10 10 10 10 10 10   TE 12  9 7 6 4 3 2 1   Neuro re-ed 12 8 7 5 4 2 2 0                       Manual          P-A mobs                                        Neuro Re-ed          TrA progression    Modified plantigrade alt arm/leg 2x10 B    Modified plantigrade alt arm/leg 2x10 B    Hip burners X15 B X15 B  X15 B  X10 B hold 5 iso X20 B  X20 B    Clamshells  2x10 B  2x10 B 2x10 hold 5  2x10 hold 5 ea with ankle add iso ball squeeze Hooklying 2x10 BTB Hooklying 2x10 BTB BKFO   Bridge  2x10     With add squeeze 2x10 2x10     With add squeeze 2x10 2x10 hold 5 with add squeeze iso 2x10 hold 5 with add squeeze iso 3x10 hold 5 with add squeeze iso  3x10 hold 5 with add squeeze iso  3x10 hold 5 with add squeeze iso    Sciatic nerve sliders TA + SLR 2 * 10 b/l TA + SLR 2 * 10 b/l 2x10 with 5 ankle pumps ea TA + SLR 2 * 10 b/l SLR 2x10 wth TA SLR 2x10 wth TA SLR 2x10 wth TA      Hamstring stretch SOS x 10 hold 10 ea leg  Prone on elbows x10 hold 3     Thera Ex          VANDA/REIL    X band walk 6x10' BTB CC 12.5# 2x10      TB shoulder ext  Backwards CC walking x10 12.5# Backwards walking CC x20 12.5# Hip abd, ext at wall x10 B  Hip abd, ext at wall x10 B  Hip abd, ext at wall x10 B on foam    TB shoulder row VANDA 3*10 VANDA 3*10 VANDA 3x10 VANDA 3x10 VANDA 3x10 VANDA 3x10 VANDA 3x10   Paloff press   X band walk 6x10' BTB Green TB doubled 2x10 hold 5  ea side palloff press X15 B purple tubing   X15 B purple tubing  X15 B purple tubing    Anti-rotation press Standing rows 2*10 GTB Standing rows 2*10 GTB Standing rows GTB 2x10 Standing row purple tubing 2x10  Standing rows green TB 2x10 hold 5 iso Standing row purple tubing 2x10  Standing row purple tubing 2x10      Standing arm ext 2*10 GTB Standing arm ext 2*10 GTB Standing arm ext 2x10 GTB   Standing arm ext 2x10 GTB Standing arm ext 2x10 GTB    Paloff press GTB 15x b/l Paloff press GTB 15x b/l - Suitcase carries 4x50' 18# Suitcase carries 4x50' 18# ea hand Suitcase carries 4x50' 18# ea hand Suitcase carries 4x50' 18# ea hand   TRX squat to chair 2x10  2x10 -   STS to chair 18# 2x5    Thera Activity          Patient education          Lifting mechanics          Posture education                                                            Modalities

## 2025-04-21 ENCOUNTER — TELEPHONE (OUTPATIENT)
Dept: PHYSICAL THERAPY | Facility: OTHER | Age: 75
End: 2025-04-21
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
## 2025-04-22 ENCOUNTER — APPOINTMENT (OUTPATIENT)
Dept: PHYSICAL THERAPY | Facility: CLINIC | Age: 75
End: 2025-04-22
Payer: COMMERCIAL

## 2025-04-23 ENCOUNTER — OFFICE VISIT (OUTPATIENT)
Dept: PHYSICAL THERAPY | Facility: CLINIC | Age: 75
End: 2025-04-23
Attending: PHYSICIAN ASSISTANT
Payer: COMMERCIAL

## 2025-04-23 DIAGNOSIS — S39.012D STRAIN OF LUMBAR REGION, SUBSEQUENT ENCOUNTER: ICD-10-CM

## 2025-04-23 DIAGNOSIS — M54.41 ACUTE BILATERAL LOW BACK PAIN WITH RIGHT-SIDED SCIATICA: Primary | ICD-10-CM

## 2025-04-23 PROCEDURE — 97110 THERAPEUTIC EXERCISES: CPT | Performed by: PHYSICAL THERAPIST

## 2025-04-23 NOTE — PROGRESS NOTES
Daily Note     Today's date: 2025  Patient name: Toan Wong  : 1950  MRN: [id number]  Referring provider: Bello Turcios PA-C  Dx:   Encounter Diagnosis     ICD-10-CM    1. Acute bilateral low back pain with right-sided sciatica  M54.41       2. Strain of lumbar region, subsequent encounter  S39.012D           Start Time: 0830  Stop Time: 0915  Total time in clinic (min): 45 minutes    Subjective: Patient has no new complaints today.       Objective: See treatment diary below      Assessment: Tolerated treatment well. Patient is able to perform all exercises with good form and no increased pain. He is progressing well in terms of function. Will continue to progress as tolerated. Patient would benefit from continued PT      Plan: Continue per plan of care.      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     upper/lower    Stroke-like symptom 2022    Vertigo     Wears glasses     for reading         Insurance Eval/ Re-eval POC expires Auth Status Total visits  Start date  Expiration date Misc   Auto 2/18 4/15 Approved Units  [id number] - 12 units  $0      TE 12          Manual 12                   Date 4/8/25 4/11/25 4/15/25 4/18/25 4/23/25   Visit Number        Auth        Manual 12 10 10 10 10 10   TE 12  4 3 2 1 0   Neuro re-ed 12 4 2 2 0 0                   Manual        P-A mobs                                Neuro Re-ed        TrA progression Modified plantigrade alt arm/leg 2x10 B    Modified plantigrade alt arm/leg 2x10 B     Hip burners X15 B  X10 B hold 5 iso X20 B  X20 B     Clamshells   2x10 hold 5 ea with ankle add iso ball squeeze Hooklying 2x10 BTB Hooklying 2x10 BTB BKFO    Bridge  2x10 hold 5 with add squeeze iso 3x10 hold 5 with add squeeze iso  3x10 hold 5 with add squeeze iso  3x10 hold 5 with add  squeeze iso  With add squeeze 2x10    Sciatic nerve sliders TA + SLR 2 * 10 b/l SLR 2x10 wth TA SLR 2x10 wth TA SLR 2x10 wth TA SLR 2x10 wth TA     Prone on elbows x10 hold 3   Sliders lateral and backwards x10 B, each   Thera Ex     NS 10' L2 prior to session    VANDA/REIL X band walk 6x10' BTB CC 12.5# 2x10    VANDA 2x10    TB shoulder ext Hip abd, ext at wall x10 B  Hip abd, ext at wall x10 B  Hip abd, ext at wall x10 B on foam     TB shoulder row VANDA 3x10 VANDA 3x10 VADNA 3x10 VANDA 3x10    Paloff press  X15 B purple tubing   X15 B purple tubing  X15 B purple tubing  X15 B purple tubing    Anti-rotation press Standing row purple tubing 2x10  Standing rows green TB 2x10 hold 5 iso Standing row purple tubing 2x10  Standing row purple tubing 2x10  Standing row purple tubing 2x10       Standing arm ext 2x10 GTB Standing arm ext 2x10 GTB Standing arm ext 2x10 GTB    Suitcase carries 4x50' 18# Suitcase carries 4x50' 18# ea hand Suitcase carries 4x50' 18# ea hand Suitcase carries 4x50' 18# ea hand Suitcase carries 4x50' 18# ea hand   TRX squat to chair   STS to chair 18# 2x5     Thera Activity        Patient education        Lifting mechanics        Posture education                                                Modalities

## 2025-04-24 ENCOUNTER — APPOINTMENT (OUTPATIENT)
Dept: PHYSICAL THERAPY | Facility: CLINIC | Age: 75
End: 2025-04-24
Payer: COMMERCIAL

## 2025-05-02 ENCOUNTER — OFFICE VISIT (OUTPATIENT)
Dept: FAMILY MEDICINE CLINIC | Facility: CLINIC | Age: 75
End: 2025-05-02
Payer: COMMERCIAL

## 2025-05-02 VITALS
RESPIRATION RATE: 16 BRPM | TEMPERATURE: 97.6 F | WEIGHT: 167 LBS | BODY MASS INDEX: 26.84 KG/M2 | HEIGHT: 66 IN | OXYGEN SATURATION: 96 % | HEART RATE: 101 BPM | DIASTOLIC BLOOD PRESSURE: 62 MMHG | SYSTOLIC BLOOD PRESSURE: 128 MMHG

## 2025-05-02 DIAGNOSIS — N18.2 TYPE 2 DIABETES MELLITUS WITH STAGE 2 CHRONIC KIDNEY DISEASE, WITHOUT LONG-TERM CURRENT USE OF INSULIN  (HCC): ICD-10-CM
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
## 2025-05-02 DIAGNOSIS — B02.29 PHN (POSTHERPETIC NEURALGIA): ICD-10-CM

## 2025-05-02 DIAGNOSIS — E11.22 TYPE 2 DIABETES MELLITUS WITH STAGE 2 CHRONIC KIDNEY DISEASE, WITHOUT LONG-TERM CURRENT USE OF INSULIN  (HCC): ICD-10-CM

## 2025-05-02 DIAGNOSIS — M54.50 ACUTE BILATERAL LOW BACK PAIN WITHOUT SCIATICA: Primary | ICD-10-CM
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
## 2025-05-02 PROCEDURE — 99214 OFFICE O/P EST MOD 30 MIN: CPT | Performed by: FAMILY MEDICINE

## 2025-05-02 RX ORDER — PREGABALIN 150 MG/1
150 CAPSULE ORAL 2 TIMES DAILY
Qty: 180 CAPSULE | Refills: 1 | Status: SHIPPED | OUTPATIENT
Start: 2025-05-02

## 2025-05-02 RX ORDER — METFORMIN HYDROCHLORIDE 500 MG/1
2000 TABLET, EXTENDED RELEASE ORAL
Qty: 360 TABLET | Refills: 1 | Status: SHIPPED | OUTPATIENT
Start: 2025-05-02
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
## 2025-05-03 NOTE — ASSESSMENT & PLAN NOTE
Ongoing  Consider SI joint dysfunction  Suggest px mgmt in addition to PT, possible CSI  Orders:    Ambulatory referral to Spine & Pain Management; Future

## 2025-05-12 ENCOUNTER — TELEPHONE (OUTPATIENT)
Dept: FAMILY MEDICINE CLINIC | Facility: CLINIC | Age: 75
End: 2025-05-12

## 2025-05-12 NOTE — TELEPHONE ENCOUNTER
Patient came into office to give Dr Law a note/request a referral for physical therapy. Note from patient is in envelope, placed in Dr Law's folder. Dr Law ordered patient a PT referral in February 2025 - 2x week for 8 weeks for lower back pain. Patient stated he had those appts but is still feeling pain, so he requesting Dr Law to make him a new referral so he could go again. Please advise you are able to make this referral, he was here for office visit on 5/2/25. Gave him referral for spine & pain mgmt that he detailed in letter. Please advise so we can make pt aware of response
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
## 2025-05-15 DIAGNOSIS — M54.50 ACUTE BILATERAL LOW BACK PAIN WITHOUT SCIATICA: Primary | ICD-10-CM

## 2025-05-22 ENCOUNTER — CONSULT (OUTPATIENT)
Dept: PAIN MEDICINE | Facility: CLINIC | Age: 75
End: 2025-05-22
Payer: COMMERCIAL

## 2025-05-22 VITALS — WEIGHT: 166 LBS | HEIGHT: 69 IN | BODY MASS INDEX: 24.59 KG/M2

## 2025-05-22 DIAGNOSIS — M54.16 LUMBAR RADICULOPATHY: Primary | ICD-10-CM

## 2025-05-22 DIAGNOSIS — G89.4 CHRONIC PAIN SYNDROME: ICD-10-CM

## 2025-05-22 DIAGNOSIS — M47.816 LUMBAR SPONDYLOSIS: ICD-10-CM

## 2025-05-22 PROCEDURE — 99204 OFFICE O/P NEW MOD 45 MIN: CPT | Performed by: STUDENT IN AN ORGANIZED HEALTH CARE EDUCATION/TRAINING PROGRAM
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
## 2025-06-01 ENCOUNTER — HOSPITAL ENCOUNTER (OUTPATIENT)
Dept: RADIOLOGY | Facility: HOSPITAL | Age: 75
Discharge: HOME | End: 2025-06-01
Attending: STUDENT IN AN ORGANIZED HEALTH CARE EDUCATION/TRAINING PROGRAM
Payer: COMMERCIAL

## 2025-06-01 DIAGNOSIS — M54.16 LUMBAR RADICULOPATHY: ICD-10-CM

## 2025-06-01 PROCEDURE — 72148 MRI LUMBAR SPINE W/O DYE: CPT

## 2025-06-02 ENCOUNTER — TELEPHONE (OUTPATIENT)
Age: 75
End: 2025-06-02

## 2025-06-02 NOTE — TELEPHONE ENCOUNTER
Caller: Patient    Doctor: Dr. SMITH    Reason for call: Got his MRI done  Stated His MRI should of been cover under [employer]    Please advise     [facility] Procedure   Call back#:

## 2025-06-03 ENCOUNTER — RESULTS FOLLOW-UP (OUTPATIENT)
Age: 75
End: 2025-06-03

## 2025-06-17 LAB — HBA1C MFR BLD: 7.2 %

## 2025-06-19 ENCOUNTER — RESULTS FOLLOW-UP (OUTPATIENT)
Dept: FAMILY MEDICINE CLINIC | Facility: CLINIC | Age: 75
End: 2025-06-19

## 2025-07-01 ENCOUNTER — APPOINTMENT (OUTPATIENT)
Dept: RADIOLOGY | Facility: HOSPITAL | Age: 75
End: 2025-07-01
Payer: COMMERCIAL

## 2025-07-01 ENCOUNTER — HOSPITAL ENCOUNTER (OUTPATIENT)
Facility: AMBULARY SURGERY CENTER | Age: 75
Setting detail: OUTPATIENT SURGERY
Discharge: HOME | End: 2025-07-01
Attending: STUDENT IN AN ORGANIZED HEALTH CARE EDUCATION/TRAINING PROGRAM | Admitting: STUDENT IN AN ORGANIZED HEALTH CARE EDUCATION/TRAINING PROGRAM
Payer: COMMERCIAL

## 2025-07-01 VITALS
DIASTOLIC BLOOD PRESSURE: 84 MMHG | SYSTOLIC BLOOD PRESSURE: 156 MMHG | TEMPERATURE: 98 F | HEART RATE: 80 BPM | OXYGEN SATURATION: 99 % | RESPIRATION RATE: 18 BRPM

## 2025-07-01 PROBLEM — M54.16 LUMBAR RADICULOPATHY: Status: ACTIVE | Noted: 2025-07-01

## 2025-07-01 LAB — GLUCOSE SERPL-MCNC: 115 MG/DL (ref 65–140)

## 2025-07-01 PROCEDURE — 62323 NJX INTERLAMINAR LMBR/SAC: CPT | Performed by: STUDENT IN AN ORGANIZED HEALTH CARE EDUCATION/TRAINING PROGRAM

## 2025-07-01 PROCEDURE — 82948 REAGENT STRIP/BLOOD GLUCOSE: CPT

## 2025-07-01 RX ORDER — METHYLPREDNISOLONE ACETATE 80 MG/ML
INJECTION, SUSPENSION INTRA-ARTICULAR; INTRALESIONAL; INTRAMUSCULAR; SOFT TISSUE AS NEEDED
Status: DISCONTINUED | OUTPATIENT
Start: 2025-07-01 | End: 2025-07-01 | Stop reason: HOSPADM

## 2025-07-01 RX ORDER — LIDOCAINE HYDROCHLORIDE 10 MG/ML
INJECTION, SOLUTION EPIDURAL; INFILTRATION; INTRACAUDAL; PERINEURAL AS NEEDED
Status: DISCONTINUED | OUTPATIENT
Start: 2025-07-01 | End: 2025-07-01 | Stop reason: HOSPADM

## 2025-07-01 RX ORDER — BUPIVACAINE HYDROCHLORIDE 2.5 MG/ML
INJECTION, SOLUTION EPIDURAL; INFILTRATION; INTRACAUDAL; PERINEURAL AS NEEDED
Status: DISCONTINUED | OUTPATIENT
Start: 2025-07-01 | End: 2025-07-01 | Stop reason: HOSPADM

## 2025-07-01 NOTE — OP NOTE
Pre-procedure Diagnosis: Lumbar radiculopathy  Post-procedure Diagnosis: Lumbar radiculopathy  Procedure Title(s):  1.  L4-L5 interlaminar epidural steroid injection      2. Intraoperative fluoroscopy  Attending Surgeon:   Rafael Olivas MD  Anesthesia:   Local     Indications: The patient is a 74 y.o. year-old male with a diagnosis of lumbar radiculopathy. The patient's history and physical exam were reviewed.  The risks, benefits and alternatives to the procedure were discussed, and all questions were answered to the patient's satisfaction. The patient agreed to proceed, and written informed consent was obtained.    Procedure in Detail: The patient was brought into the procedure room and placed in the prone position on the fluoroscopy table. The area of the lumbar spine was prepped with chlorhexidine gluconate solution times one and draped in a sterile manner.    The L4-L5 interspace was identified and marked under AP fluoroscopy. The skin and subcutaneous tissues in the area were anesthetized with 1% lidocaine. A 20-gauge Tuohy epidural needle was directed toward the interspace under fluoroscopic guidance until the ligamentum flavum was engaged. From this point, a loss of resistance technique with air was used to identify entrance of the needle into the epidural space. Once an appropriate loss was obtained, negative aspiration was confirmed, and 1 ml Omnipaque 300 contrast solution was injected. An appropriate epidurogram was noted.    Then, after negative aspiration, a solution consisting of 1-mL depo-medrol (80mg/mL) and 1-mL bupivacaine 0.25% and 3-mL preservative-free saline was easily injected. The needle was removed with a 1% lidocaine flush. The patient's back was cleaned and a bandage was placed over the site of needle insertion.    Disposition: The patient tolerated the procedure well, and there were no apparent complications. The patient was taken to the recovery area where written discharge  instructions for the procedure were given.     Estimated Blood Loss: None  Specimens Obtained: N/A

## 2025-07-01 NOTE — DISCHARGE INSTRUCTIONS
Epidural Steroid Injection   WHAT YOU NEED TO KNOW:   An epidural steroid injection (SAMPSON) is a procedure to inject steroid medicine into the epidural space. The epidural space is between your spinal cord and vertebrae. Steroids reduce inflammation and fluid buildup in your spine that may be causing pain. You may be given pain medicine along with the steroids.          ACTIVITY  Do not drive or operate machinery today.  No strenuous activity today - bending, lifting, etc.  You may resume normal activites starting tomorrow - start slowly and as tolerated.  You may shower today, but no tub baths or hot tubs.  You may have numbness for several hours from the local anesthetic. Please use caution and common sense, especially with weight-bearing activities.    CARE OF THE INJECTION SITE  If you have soreness or pain, apply ice to the area today (20 minutes on/20 minutes off).  Starting tomorrow, you may use warm, moist heat or ice if needed.  You may have an increase or change in your discomfort for 36-48 hours after your treatment.  Apply ice and continue with any pain medication you have been prescribed.  Notify the [facility] if you have any of the following: redness, drainage, swelling, headache, stiff neck or fever above 100°F.    SPECIAL INSTRUCTIONS  Our office will contact you in approximately 14 days for a progress report.    MEDICATIONS  Continue to take all routine medications.  Our office may have instructed you to hold some medications.    As no general anesthesia was used in today's procedure, you should not experience any side effects related to anesthesia.     If you are diabetic, the steroids used in today's injection may temporarily increase your blood sugar levels after the first few days after your injection. Please keep a close eye on your sugars and alert the doctor who manages your diabetes if your sugars are significantly high from your baseline or you are symptomatic.     If you have a  problem specifically related to your procedure, please call our office at [phone].  Problems not related to your procedure should be directed to your primary care physician.

## 2025-07-01 NOTE — H&P
History of Present Illness: The patient is a 74 y.o. male who presents with complaints of low back pain.     Past Medical History[1]    Past Surgical History[2]    Current Medications[3]    Allergies[4]    Physical Exam:   Vitals:    07/01/25 0754   BP: 158/88   Pulse: 78   Resp: 18   Temp: 98 °F (36.7 °C)   SpO2: 100%     General: Awake, Alert, Oriented x 3, Mood and affect appropriate  Respiratory: Respirations even and unlabored  Cardiovascular: Peripheral pulses intact; no edema  Musculoskeletal Exam: low back pain.     ASA Score: 3    Patient/Chart Verification  Patient ID Verified: Verbal, Armband  H&P( within 30 days) Verified: Yes  Interval H&P(within 24 hr) Complete (required for Outpatients and Surgery Admit only): Yes  Allergies Reviewed: Yes    Assessment: Lumbar radiculopathy    Plan: L4-L5 LESI         [1]   Past Medical History:  Diagnosis Date    Diabetes mellitus (HCC)     GERD (gastroesophageal reflux disease)     Hiatal hernia     Presence of upper and lower permanent dental bridges     upper/lower    Stroke-like symptom 04/05/2022    Vertigo     Wears glasses     for reading   [2]   Past Surgical History:  Procedure Laterality Date    COLONOSCOPY     [3] No current facility-administered medications for this encounter.  [4] No Known Allergies

## 2025-08-11 ENCOUNTER — OFFICE VISIT (OUTPATIENT)
Dept: FAMILY MEDICINE CLINIC | Facility: CLINIC | Age: 75
End: 2025-08-11
Payer: COMMERCIAL

## (undated) DEVICE — Device

## (undated) DEVICE — TRAY PAIN SUPPORT

## (undated) DEVICE — PLASTIC ADHESIVE BANDAGE: Brand: CURITY

## (undated) DEVICE — RADIOLOGY STERILE LABELS: Brand: CENTURION

## (undated) DEVICE — CHLORAPREP APPLICATOR TINTED 10.5ML ONE-STEP